# Patient Record
Sex: MALE | Race: WHITE | NOT HISPANIC OR LATINO | URBAN - METROPOLITAN AREA
[De-identification: names, ages, dates, MRNs, and addresses within clinical notes are randomized per-mention and may not be internally consistent; named-entity substitution may affect disease eponyms.]

---

## 2019-03-12 ENCOUNTER — INPATIENT (INPATIENT)
Facility: HOSPITAL | Age: 84
LOS: 5 days | Discharge: NURSING, REHAB OR LONG-TERM CARE | DRG: 194 | End: 2019-03-18
Payer: MEDICARE

## 2019-03-12 VITALS
TEMPERATURE: 99 F | SYSTOLIC BLOOD PRESSURE: 143 MMHG | RESPIRATION RATE: 22 BRPM | WEIGHT: 177.03 LBS | HEIGHT: 68 IN | HEART RATE: 70 BPM | DIASTOLIC BLOOD PRESSURE: 66 MMHG | OXYGEN SATURATION: 91 %

## 2019-03-12 DIAGNOSIS — Z96.653 PRESENCE OF ARTIFICIAL KNEE JOINT, BILATERAL: Chronic | ICD-10-CM

## 2019-03-12 DIAGNOSIS — F41.9 ANXIETY DISORDER, UNSPECIFIED: ICD-10-CM

## 2019-03-12 DIAGNOSIS — R63.8 OTHER SYMPTOMS AND SIGNS CONCERNING FOOD AND FLUID INTAKE: ICD-10-CM

## 2019-03-12 DIAGNOSIS — Z91.89 OTHER SPECIFIED PERSONAL RISK FACTORS, NOT ELSEWHERE CLASSIFIED: ICD-10-CM

## 2019-03-12 DIAGNOSIS — Z86.2 PERSONAL HISTORY OF DISEASES OF THE BLOOD AND BLOOD-FORMING ORGANS AND CERTAIN DISORDERS INVOLVING THE IMMUNE MECHANISM: ICD-10-CM

## 2019-03-12 DIAGNOSIS — E03.9 HYPOTHYROIDISM, UNSPECIFIED: ICD-10-CM

## 2019-03-12 DIAGNOSIS — Z98.89 OTHER SPECIFIED POSTPROCEDURAL STATES: Chronic | ICD-10-CM

## 2019-03-12 DIAGNOSIS — J10.1 INFLUENZA DUE TO OTHER IDENTIFIED INFLUENZA VIRUS WITH OTHER RESPIRATORY MANIFESTATIONS: ICD-10-CM

## 2019-03-12 DIAGNOSIS — N18.9 CHRONIC KIDNEY DISEASE, UNSPECIFIED: ICD-10-CM

## 2019-03-12 LAB
ALBUMIN SERPL ELPH-MCNC: 3.1 G/DL — LOW (ref 3.3–5)
ALP SERPL-CCNC: 60 U/L — SIGNIFICANT CHANGE UP (ref 40–120)
ALT FLD-CCNC: 6 U/L — LOW (ref 10–45)
ANION GAP SERPL CALC-SCNC: 10 MMOL/L — SIGNIFICANT CHANGE UP (ref 5–17)
APPEARANCE UR: CLEAR — SIGNIFICANT CHANGE UP
APTT BLD: 31 SEC — SIGNIFICANT CHANGE UP (ref 27.5–36.3)
AST SERPL-CCNC: 13 U/L — SIGNIFICANT CHANGE UP (ref 10–40)
BASOPHILS # BLD AUTO: 0.05 K/UL — SIGNIFICANT CHANGE UP (ref 0–0.2)
BASOPHILS NFR BLD AUTO: 0.7 % — SIGNIFICANT CHANGE UP (ref 0–2)
BILIRUB SERPL-MCNC: 0.9 MG/DL — SIGNIFICANT CHANGE UP (ref 0.2–1.2)
BILIRUB UR-MCNC: NEGATIVE — SIGNIFICANT CHANGE UP
BUN SERPL-MCNC: 13 MG/DL — SIGNIFICANT CHANGE UP (ref 7–23)
CALCIUM SERPL-MCNC: 8.3 MG/DL — LOW (ref 8.4–10.5)
CHLORIDE SERPL-SCNC: 102 MMOL/L — SIGNIFICANT CHANGE UP (ref 96–108)
CO2 SERPL-SCNC: 25 MMOL/L — SIGNIFICANT CHANGE UP (ref 22–31)
COLOR SPEC: YELLOW — SIGNIFICANT CHANGE UP
CREAT SERPL-MCNC: 1.22 MG/DL — SIGNIFICANT CHANGE UP (ref 0.5–1.3)
DIFF PNL FLD: NEGATIVE — SIGNIFICANT CHANGE UP
EOSINOPHIL # BLD AUTO: 0.35 K/UL — SIGNIFICANT CHANGE UP (ref 0–0.5)
EOSINOPHIL NFR BLD AUTO: 4.9 % — SIGNIFICANT CHANGE UP (ref 0–6)
FLU A RESULT: DETECTED
FLU A RESULT: DETECTED
FLUAV AG NPH QL: DETECTED
FLUBV AG NPH QL: SIGNIFICANT CHANGE UP
GAS PNL BLDV: SIGNIFICANT CHANGE UP
GLUCOSE SERPL-MCNC: 97 MG/DL — SIGNIFICANT CHANGE UP (ref 70–99)
GLUCOSE UR QL: NEGATIVE — SIGNIFICANT CHANGE UP
HCT VFR BLD CALC: 34.4 % — LOW (ref 39–50)
HGB BLD-MCNC: 11.7 G/DL — LOW (ref 13–17)
IMM GRANULOCYTES NFR BLD AUTO: 1.6 % — HIGH (ref 0–1.5)
INR BLD: 1.23 — HIGH (ref 0.88–1.16)
KETONES UR-MCNC: NEGATIVE — SIGNIFICANT CHANGE UP
LACTATE SERPL-SCNC: 0.9 MMOL/L — SIGNIFICANT CHANGE UP (ref 0.5–2)
LEUKOCYTE ESTERASE UR-ACNC: NEGATIVE — SIGNIFICANT CHANGE UP
LYMPHOCYTES # BLD AUTO: 0.69 K/UL — LOW (ref 1–3.3)
LYMPHOCYTES # BLD AUTO: 9.7 % — LOW (ref 13–44)
MCHC RBC-ENTMCNC: 31.1 PG — SIGNIFICANT CHANGE UP (ref 27–34)
MCHC RBC-ENTMCNC: 34 GM/DL — SIGNIFICANT CHANGE UP (ref 32–36)
MCV RBC AUTO: 91.5 FL — SIGNIFICANT CHANGE UP (ref 80–100)
MONOCYTES # BLD AUTO: 0.76 K/UL — SIGNIFICANT CHANGE UP (ref 0–0.9)
MONOCYTES NFR BLD AUTO: 10.7 % — SIGNIFICANT CHANGE UP (ref 2–14)
NEUTROPHILS # BLD AUTO: 5.13 K/UL — SIGNIFICANT CHANGE UP (ref 1.8–7.4)
NEUTROPHILS NFR BLD AUTO: 72.4 % — SIGNIFICANT CHANGE UP (ref 43–77)
NITRITE UR-MCNC: NEGATIVE — SIGNIFICANT CHANGE UP
NRBC # BLD: 0 /100 WBCS — SIGNIFICANT CHANGE UP (ref 0–0)
PH UR: 6 — SIGNIFICANT CHANGE UP (ref 5–8)
PLATELET # BLD AUTO: 152 K/UL — SIGNIFICANT CHANGE UP (ref 150–400)
POTASSIUM SERPL-MCNC: 4.2 MMOL/L — SIGNIFICANT CHANGE UP (ref 3.5–5.3)
POTASSIUM SERPL-SCNC: 4.2 MMOL/L — SIGNIFICANT CHANGE UP (ref 3.5–5.3)
PROT SERPL-MCNC: 5.9 G/DL — LOW (ref 6–8.3)
PROT UR-MCNC: 30 MG/DL
PROTHROM AB SERPL-ACNC: 14 SEC — HIGH (ref 10–12.9)
RBC # BLD: 3.76 M/UL — LOW (ref 4.2–5.8)
RBC # FLD: 13 % — SIGNIFICANT CHANGE UP (ref 10.3–14.5)
RSV RESULT: SIGNIFICANT CHANGE UP
RSV RNA RESP QL NAA+PROBE: SIGNIFICANT CHANGE UP
SODIUM SERPL-SCNC: 137 MMOL/L — SIGNIFICANT CHANGE UP (ref 135–145)
SP GR SPEC: 1.02 — SIGNIFICANT CHANGE UP (ref 1–1.03)
UROBILINOGEN FLD QL: 0.2 E.U./DL — SIGNIFICANT CHANGE UP
WBC # BLD: 7.09 K/UL — SIGNIFICANT CHANGE UP (ref 3.8–10.5)
WBC # FLD AUTO: 7.09 K/UL — SIGNIFICANT CHANGE UP (ref 3.8–10.5)

## 2019-03-12 PROCEDURE — 93010 ELECTROCARDIOGRAM REPORT: CPT

## 2019-03-12 PROCEDURE — 71045 X-RAY EXAM CHEST 1 VIEW: CPT | Mod: 26

## 2019-03-12 PROCEDURE — 99285 EMERGENCY DEPT VISIT HI MDM: CPT | Mod: 25

## 2019-03-12 PROCEDURE — 99223 1ST HOSP IP/OBS HIGH 75: CPT | Mod: GC

## 2019-03-12 RX ORDER — SODIUM CHLORIDE 9 MG/ML
1000 INJECTION INTRAMUSCULAR; INTRAVENOUS; SUBCUTANEOUS ONCE
Qty: 0 | Refills: 0 | Status: COMPLETED | OUTPATIENT
Start: 2019-03-12 | End: 2019-03-12

## 2019-03-12 RX ORDER — HEPARIN SODIUM 5000 [USP'U]/ML
5000 INJECTION INTRAVENOUS; SUBCUTANEOUS EVERY 8 HOURS
Qty: 0 | Refills: 0 | Status: DISCONTINUED | OUTPATIENT
Start: 2019-03-12 | End: 2019-03-18

## 2019-03-12 RX ORDER — CEFTRIAXONE 500 MG/1
2 INJECTION, POWDER, FOR SOLUTION INTRAMUSCULAR; INTRAVENOUS ONCE
Qty: 0 | Refills: 0 | Status: COMPLETED | OUTPATIENT
Start: 2019-03-12 | End: 2019-03-12

## 2019-03-12 RX ORDER — ESCITALOPRAM OXALATE 10 MG/1
10 TABLET, FILM COATED ORAL DAILY
Qty: 0 | Refills: 0 | Status: DISCONTINUED | OUTPATIENT
Start: 2019-03-12 | End: 2019-03-18

## 2019-03-12 RX ORDER — ACETAMINOPHEN 500 MG
1000 TABLET ORAL ONCE
Qty: 0 | Refills: 0 | Status: COMPLETED | OUTPATIENT
Start: 2019-03-12 | End: 2019-03-12

## 2019-03-12 RX ORDER — VANCOMYCIN HCL 1 G
1000 VIAL (EA) INTRAVENOUS ONCE
Qty: 0 | Refills: 0 | Status: COMPLETED | OUTPATIENT
Start: 2019-03-12 | End: 2019-03-12

## 2019-03-12 RX ORDER — ACETAMINOPHEN 500 MG
650 TABLET ORAL EVERY 6 HOURS
Qty: 0 | Refills: 0 | Status: DISCONTINUED | OUTPATIENT
Start: 2019-03-12 | End: 2019-03-18

## 2019-03-12 RX ORDER — LEVOTHYROXINE SODIUM 125 MCG
1 TABLET ORAL
Qty: 0 | Refills: 0 | COMMUNITY

## 2019-03-12 RX ORDER — AZITHROMYCIN 500 MG/1
500 TABLET, FILM COATED ORAL DAILY
Qty: 0 | Refills: 0 | Status: DISCONTINUED | OUTPATIENT
Start: 2019-03-12 | End: 2019-03-13

## 2019-03-12 RX ORDER — LEVOTHYROXINE SODIUM 125 MCG
100 TABLET ORAL DAILY
Qty: 0 | Refills: 0 | Status: DISCONTINUED | OUTPATIENT
Start: 2019-03-12 | End: 2019-03-18

## 2019-03-12 RX ORDER — ESCITALOPRAM OXALATE 10 MG/1
1 TABLET, FILM COATED ORAL
Qty: 0 | Refills: 0 | COMMUNITY

## 2019-03-12 RX ORDER — ACETAMINOPHEN 500 MG
650 TABLET ORAL ONCE
Qty: 0 | Refills: 0 | Status: COMPLETED | OUTPATIENT
Start: 2019-03-12 | End: 2019-03-12

## 2019-03-12 RX ORDER — IPRATROPIUM/ALBUTEROL SULFATE 18-103MCG
3 AEROSOL WITH ADAPTER (GRAM) INHALATION ONCE
Qty: 0 | Refills: 0 | Status: COMPLETED | OUTPATIENT
Start: 2019-03-12 | End: 2019-03-12

## 2019-03-12 RX ORDER — CEFTRIAXONE 500 MG/1
1 INJECTION, POWDER, FOR SOLUTION INTRAMUSCULAR; INTRAVENOUS EVERY 24 HOURS
Qty: 0 | Refills: 0 | Status: DISCONTINUED | OUTPATIENT
Start: 2019-03-13 | End: 2019-03-13

## 2019-03-12 RX ADMIN — Medication 650 MILLIGRAM(S): at 13:36

## 2019-03-12 RX ADMIN — SODIUM CHLORIDE 100 MILLILITER(S): 9 INJECTION INTRAMUSCULAR; INTRAVENOUS; SUBCUTANEOUS at 20:45

## 2019-03-12 RX ADMIN — Medication 650 MILLIGRAM(S): at 21:45

## 2019-03-12 RX ADMIN — HEPARIN SODIUM 5000 UNIT(S): 5000 INJECTION INTRAVENOUS; SUBCUTANEOUS at 21:54

## 2019-03-12 RX ADMIN — Medication 75 MILLIGRAM(S): at 18:35

## 2019-03-12 RX ADMIN — Medication 650 MILLIGRAM(S): at 14:36

## 2019-03-12 RX ADMIN — Medication 3 MILLILITER(S): at 21:54

## 2019-03-12 RX ADMIN — CEFTRIAXONE 2 GRAM(S): 500 INJECTION, POWDER, FOR SOLUTION INTRAMUSCULAR; INTRAVENOUS at 14:13

## 2019-03-12 RX ADMIN — CEFTRIAXONE 100 GRAM(S): 500 INJECTION, POWDER, FOR SOLUTION INTRAMUSCULAR; INTRAVENOUS at 13:43

## 2019-03-12 RX ADMIN — SODIUM CHLORIDE 1000 MILLILITER(S): 9 INJECTION INTRAMUSCULAR; INTRAVENOUS; SUBCUTANEOUS at 13:53

## 2019-03-12 RX ADMIN — Medication 400 MILLIGRAM(S): at 19:54

## 2019-03-12 RX ADMIN — Medication 250 MILLIGRAM(S): at 14:10

## 2019-03-12 RX ADMIN — Medication 650 MILLIGRAM(S): at 20:44

## 2019-03-12 RX ADMIN — SODIUM CHLORIDE 1000 MILLILITER(S): 9 INJECTION INTRAMUSCULAR; INTRAVENOUS; SUBCUTANEOUS at 13:04

## 2019-03-12 NOTE — H&P ADULT - HISTORY OF PRESENT ILLNESS
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 The patient was admitted last Friday to [facility] in [place], where he was treated for a UTI and discharged on Keflex to rehab at the [facility] 1 day prior to admission. Yesterday, when at rehab, the patient began to develop cough productive of yellow sputum, which had small streaks of blood in it this morning. He was also noted to have a fever to 101F, and was therefore sent to the ED for further workup. History obtained from family at bedside, who says that the patient is at his baseline mental status though he does not remember . The patient denies any symptoms including subjective fever, CP, SOB, n/v/d, cough (?), abdominal pain, dysuria. 89 yo M with PMHx stroke (cerebellar per family, no residual deficits), ITP, b/l knee replacements who presents for fever and productive cough x 1 day. At baseline the patient is able to walk with 1-2 person assist and walker, has poor memory though no diagnosis of dementia per ED, and needs help at home with ADLs such as bathing. The patient was admitted last Friday to [facility] in [place], where he was treated for a UTI and discharged on Keflex to rehab at the [facility] in [facility] 1 day prior to admission. Yesterday, when at rehab, the patient began to develop cough productive of yellow sputum, which had small streaks of blood in it this morning. He was also noted to have a fever to 101F, and was therefore sent to the ED for further workup. History obtained from family at bedside, who says that the patient is at his baseline mental status though his memory is poor. The patient denies any symptoms including subjective fever, CP, SOB, n/v/d, cough (though has been as above), abdominal pain, dysuria.     In the ED, /66, HR 70, RR 22, T 99.2, O2 91% on RA. Labs were notable for WBC 7, Hgb 11.7, Plt 152, INR 1.23, Creatinine 1.22, VBG with respiratory alkalosis. CXR showed mild interstitial pulmonary edema and bibasilar atelectasis with no obvious consolidation; RVP was positive for influenza A. He was admitted to medicine for further management of influenza. 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 At baseline the patient is able to walk with 1-2 person assist and walker, has poor memory though no diagnosis of dementia per ED, and needs help at home with ADLs such as bathing. The patient was admitted last Friday to [facility] in [place], where he was treated for a UTI and discharged on Keflex to rehab at the [facility] in [facility] 1 day prior to admission. Yesterday, when at rehab, the patient began to develop cough productive of yellow sputum, which had small streaks of blood in it this morning. He was also noted to have a fever to 101F, and was therefore sent to the ED for further workup. History obtained from family at bedside, who says that the patient is at his baseline mental status though his memory is poor. The patient denies any symptoms including subjective fever, CP, SOB, n/v/d, cough (though has been as above), abdominal pain, dysuria.     In the ED, /66, HR 70, RR 22, T 99.2, O2 91% on RA. Labs were notable for WBC 7, Hgb 11.7, Plt 152, INR 1.23, Creatinine 1.22, VBG with respiratory alkalosis. CXR showed mild interstitial pulmonary edema and bibasilar atelectasis with no obvious consolidation; RVP was positive for influenza A. He was given a 1 L NS bolus, 2g ceftriaxone, 1 g vancomycin, and 75 mg tamiflu in the ED. He was admitted to medicine for further management of influenza.

## 2019-03-12 NOTE — H&P ADULT - NSHPSOCIALHISTORY_GEN_ALL_CORE
Retired [employer].   Never smoker, social alcohol use in the past. No illicit or IV drug use.

## 2019-03-12 NOTE — ED PROVIDER NOTE - CLINICAL SUMMARY MEDICAL DECISION MAKING FREE TEXT BOX
89M with concern for fever, recent hospitalization for UTI now pt with cough, blood in sputum, no massive hemoptysis. Pt with fever despite taking Keflex at sub acute rehab. Plan for admission for IV abx, further w/u by Dr. Dewey. Pt with likely bronchitis, pt denies sob, no tachycardia.

## 2019-03-12 NOTE — ED ADULT TRIAGE NOTE - CHIEF COMPLAINT QUOTE
Pt BIBA from nursing home [facility].  Per daughter who is HCP "He recently completed a course of Abx for a UTI."  Pt upgraded to MD [place], code sepsis called.  Afebrile at this time, report temp of 101 @ NH earlier today.

## 2019-03-12 NOTE — ED CLERICAL - NS ED CLERK NOTE PRE-ARRIVAL INFORMATION; ADDITIONAL PRE-ARRIVAL INFORMATION
89 Y/O M ALIS CADET BEING SENT IN BY DR QUEENIE DEWEY FOR FEVER PLEASE CALL DR DEWEY WHEN PT ARRIVES// r/o urosepsis, admit to Dr. Jameson Dewey

## 2019-03-12 NOTE — H&P ADULT - NSICDXPASTMEDICALHX_GEN_ALL_CORE_FT
PAST MEDICAL HISTORY:  CVA (cerebral infarction) cerebellar stroke per family. Had dysphagia after stroke which since resolved.    Dysphagia due to recent cerebrovascular accident now resolved.    Hypertension

## 2019-03-12 NOTE — H&P ADULT - NSHPPHYSICALEXAM_GEN_ALL_CORE
PHYSICAL EXAM:   VITAL SIGNS:  T(C): 36.4 (03-12-19 @ 17:55), Max: 37.3 (03-12-19 @ 12:46)  HR: 77 (03-12-19 @ 17:55) (69 - 77)  BP: 161/71 (03-12-19 @ 17:55) (112/54 - 161/71)  RR: 18 (03-12-19 @ 17:55) (18 - 22)  SpO2: 95% (03-12-19 @ 17:55) (91% - 95%)  Constitutional: elderly male, resting comfortably in bed; no acute respiratory distress on 2L NC  Head: NC/AT  Eyes: PERRL, EOMI, clear conjunctiva  ENT: no nasal discharge; uvula midline, no oropharyngeal erythema or exudates; MMM  Respiratory: +R sided rhonchi, +bibasilar crackles more prominent on left.   Cardiac: +S1/S2; RRR; no M/R/G  Gastrointestinal: soft, NT/ND; no rebound or guarding  Back: spine midline, no bony tenderness; no CVAT B/L  Extremities: WWP, no clubbing or cyanosis; no peripheral edema  Vascular: 2+ radial, DP/PT pulses B/L  Dermatologic: skin warm, dry and intact; no rashes, wounds, or scars, no petechiae  Neurologic: AAOx3; CNII-XII grossly intact; no focal deficits PHYSICAL EXAM:   VITAL SIGNS:  T(C): 36.4 (03-12-19 @ 17:55), Max: 37.3 (03-12-19 @ 12:46)  HR: 77 (03-12-19 @ 17:55) (69 - 77)  BP: 161/71 (03-12-19 @ 17:55) (112/54 - 161/71)  RR: 18 (03-12-19 @ 17:55) (18 - 22)  SpO2: 95% (03-12-19 @ 17:55) (91% - 95%)  Constitutional: elderly male, resting comfortably in bed; no acute respiratory distress on 2L NC  Head: NC/AT  Eyes: PERRL, EOMI, clear conjunctiva  ENT: no nasal discharge; uvula midline, no oropharyngeal erythema or exudates; MMM  Respiratory: +R sided rhonchi, +bibasilar crackles more prominent on left.   Cardiac: +S1/S2; RRR; no M/R/G  Gastrointestinal: soft, NT/ND; no rebound or guarding  Back: spine midline, no bony tenderness; no CVAT B/L  Extremities: WWP, no clubbing or cyanosis; no peripheral edema  Vascular: 2+ radial, DP/PT pulses B/L  Dermatologic: skin warm, dry and intact; no rashes, wounds, or scars, no petechiae  Neurologic: AAOx1 to place; 4/5 strength in all 4 extremities.

## 2019-03-12 NOTE — H&P ADULT - ASSESSMENT
89 yo M with PMHx stroke (cerebellar per family, no residual deficits), ITP, b/l knee replacements who presents from rehab (after recent hospitalization for UTI) with fever and productive cough x 1 day, found to be influenza A positive. His symptoms are consistent with influenza infection, with possible pneumonia not able to ruled out.

## 2019-03-12 NOTE — ED ADULT NURSE NOTE - CHPI ED NUR HIGHEST TEMPERATURE
[facility]

## 2019-03-12 NOTE — H&P ADULT - NSICDXPROBLEM_GEN_ALL_CORE_FT
PROBLEM DIAGNOSES  Problem: Type A influenza  Assessment and Plan:     Problem: History of ITP  Assessment and Plan:     Problem: Nutrition, metabolism, and development symptoms  Assessment and Plan:     Problem: Transition of care performed with sharing of clinical summary  Assessment and Plan: 1) PCP Contacted On Admission: (Y/N) No    Name, Phone # Dr. B  2) Date of Contact with PCP:   3) PCP Contacted at Discharge: (Y/N)  4) Summary of Handoff Given to PCP:   5) Post Discharge Appointment Date and Location:       R/O PROBLEM DIAGNOSES  Problem: Pneumonia due to infectious organism  Assessment and Plan:     Problem: History of ITP  Assessment and Plan: PROBLEM DIAGNOSES  Problem: Type A influenza  Assessment and Plan: Influenza A + on RVP, likely causing his presenting symptoms of fever and productive cough. Likely caught during prior hospitalization for UTI.   - tamiflu x 5 days (renally dosed)   - supportive treatment, Tylenol PRN for fever/discomfort  - on 2L NC, wean as tolerated    Problem: Chronic kidney disease (CKD)  Assessment and Plan: Creatinine on admission elevated, CKD Stage III vs. JORDANA. Unknown baseline. May be elevated in setting of infection vs. recent antibiotic use vs. baseline CKD.   - continue to monitor  - obtain collateral from PCP    Problem: History of ITP  Assessment and Plan: Has a history of ITP for which he has been on steroid courses for in the past, most recently was on short 1 week steroid taper that ended 3/8. Follows with Dr. Madrid in [place]. Gets Nplate (romiplostim) shots occasionally per family, most recently on Friday. Currently low normal platelet count.   - obtain collateral from Dr. Madrid regarding need for further romiplostim infusions or treatment; likely would be as an outpatient  - no additional treatment    Problem: Hypothyroidism  Assessment and Plan: .   - continue home levothyroxine 100 mcg daily    Problem: Anxiety  Assessment and Plan: History of depression/anxiety, on escitalopram for many years.   - continue home escitalopram 10 mg    Problem: Nutrition, metabolism, and development symptoms  Assessment and Plan: F: no IVF  E: replete K > 4, Mg > 2  N: regular diet   PPX: HSQ  Code: Full code for now, but needs to be clarified with family/HCP given patient does not appear to have capacity. Attempted to call family multiple times 3/12 but unable to reach.     Problem: Transition of care performed with sharing of clinical summary  Assessment and Plan: 1) PCP Contacted On Admission: (Y/N) No    Name, Phone # Dr. Madrid [facility]) [phone]. Attempted to call [facility] rehab 3/12 but was unable to reach staff.   2) Date of Contact with PCP:    3) PCP Contacted at Discharge: (Y/N)  4) Summary of Handoff Given to PCP:   5) Post Discharge Appointment Date and Location:       R/O PROBLEM DIAGNOSES  Problem: Pneumonia due to infectious organism  Assessment and Plan: Also possibly has overlying pneumonia, cannot rule out at this time. Does have risk factors for drug resistant organisms given recent hospitalization and antibiotic treatment for UTI. S/p 1 dose vancomycin in ED (3/12).   - ID Dr. Dewey and pulm Dr. Winkler following, appreciate recs  - continue azithromycin 500 mg x 3 days(3/12 - )  - continue ceftriaxone 1 g IV daily (3/12 - )  - obtain sputum culture  - urine legionella antigen PROBLEM DIAGNOSES  Problem: Type A influenza  Assessment and Plan: Influenza A + on RVP, likely causing his presenting symptoms of fever and productive cough. Likely caught during prior hospitalization for UTI. Did not meet criteria for sepsis with 1/4 SIRS criteria.   - tamiflu x 5 days (renally dosed)   - supportive treatment, Tylenol PRN for fever/discomfort  - on 2L NC, wean as tolerated    Problem: Chronic kidney disease (CKD)  Assessment and Plan: Creatinine on admission elevated, CKD Stage III vs. JORDANA. Unknown baseline. May be elevated in setting of infection vs. recent antibiotic use vs. baseline CKD.   - s/p 1 L NS in ED; careful with additional fluids given some fluid overload on CXR despite no CHF history per daughter  - continue to monitor  - obtain collateral from PCP    Problem: History of ITP  Assessment and Plan: Has a history of ITP for which he has been on steroid courses for in the past, most recently was on short 1 week steroid taper that ended 3/8. Follows with Dr. Madrid in [place]. Gets Nplate (romiplostim) shots occasionally per family, most recently on Friday. Currently low normal platelet count.   - obtain collateral from Dr. Madrid regarding need for further romiplostim infusions or treatment; likely would be as an outpatient  - no additional treatment    Problem: Hypothyroidism  Assessment and Plan: .   - continue home levothyroxine 100 mcg daily    Problem: Anxiety  Assessment and Plan: History of depression/anxiety, on escitalopram for many years.   - continue home escitalopram 10 mg    Problem: Nutrition, metabolism, and development symptoms  Assessment and Plan: F: no IVF  E: replete K > 4, Mg > 2  N: regular diet   PPX: HSQ  Code: DNR/DNI. Confirmed with daughter/HCP (Mary Ann Madsen [phone]) on 3/12.     Problem: Transition of care performed with sharing of clinical summary  Assessment and Plan: 1) PCP Contacted On Admission: (Y/N) No    Name, Phone # Dr. Madrid [facility]) [phone]. Attempted to call [facility] rehab 3/12 but was unable to reach staff.   2) Date of Contact with PCP:    3) PCP Contacted at Discharge: (Y/N)  4) Summary of Handoff Given to PCP:   5) Post Discharge Appointment Date and Location:       R/O PROBLEM DIAGNOSES  Problem: Pneumonia due to infectious organism  Assessment and Plan: Also possibly has overlying pneumonia, cannot rule out at this time. Does have risk factors for drug resistant organisms given recent hospitalization and antibiotic treatment for UTI. S/p 1 dose vancomycin in ED (3/12).   - ID Dr. Dewey and pulm Dr. Winkler following, appreciate recs  - continue azithromycin 500 mg x 3 days(3/12 - )  - continue ceftriaxone 1 g IV daily (3/12 - )  - obtain sputum culture  - urine legionella antigen

## 2019-03-12 NOTE — ED PROVIDER NOTE - PHYSICAL EXAMINATION
gen: sleeping arousable to voice   HEENT: oropharynx clear dry mm  Neck: supple,   CV: rrr   Resp: ctab,  Abd: nontender, no rebound/guarding  Ext: no edema  Neuro: able to answer questions moves all 4 ext to command facial droop at baseline

## 2019-03-12 NOTE — H&P ADULT - NSHPLABSRESULTS_GEN_ALL_CORE
LABS:                        11.7   7.09  )-----------( 152      ( 12 Mar 2019 13:07 )             34.4     0312    137  |  102  |  13  ----------------------------<  97  4.2   |  25  |  1.22    Ca    8.3<L>      12 Mar 2019 13:07    TPro  5.9<L>  /  Alb  3.1<L>  /  TBili  0.9  /  DBili  x   /  AST  13  /  ALT  6<L>  /  AlkPhos  60  03-12    PT/INR - ( 12 Mar 2019 13:07 )   PT: 14.0 sec;   INR: 1.23     PTT - ( 12 Mar 2019 13:07 )  PTT:31.0 sec    Urinalysis Basic - ( 12 Mar 2019 13:34 )    Color: Yellow / Appearance: Clear / S.020 / pH: x  Gluc: x / Ketone: NEGATIVE  / Bili: Negative / Urobili: 0.2 E.U./dL   Blood: x / Protein: 30 mg/dL / Nitrite: NEGATIVE   Leuk Esterase: NEGATIVE / RBC: < 5 /HPF / WBC < 5 /HPF   Sq Epi: x / Non Sq Epi: 0-5 /HPF / Bacteria: Present /HPF    FLU A B RSV Detection by PCR (19 @ 14:09)    Flu A Result: Detected: TYPE:(C=Critical, N=Notification, A=Abnormal) C  TESTS: FLU A B RSV  DATE/TIME CALLED: 19 14:59  CALLED TO: ALEXIS Wellington RN  READ BACK (2 Patient Identifiers)(Y/N): Y  READ BACK VALUES (Y/N): Y  CALLED BY: [facility]  The Flu A B RSV assay is a Real-Time PCR test for the qualitative  detection and differentiation of Influenza A, Influenza B, and  Respiratory Syncytial Virus on nasopharyngeal swabs. The results should  be interpreted in the context of all clinical and laboratory findings.    Flu B Result: NotDetec    RSV Result: NotDete    RADIOLOGY & ADDITIONAL TESTS:   CXR: +bibasilar increased interstitial markings, no obvious consolidation  EKG: NSR @ 72, T wave inversion in aVL, V4-6.

## 2019-03-12 NOTE — ED ADULT NURSE NOTE - OBJECTIVE STATEMENT
BIBA from rehab facility accompanied by daughter reported having hemoptysis and cough that started today. Pt was being treated for UTI last week as per daughter and became more lethargic today.

## 2019-03-12 NOTE — H&P ADULT - ATTENDING COMMENTS
Pt seen and examined at bedside on 3/11/2019 @ 2330    Agree with HPI, ROS as above.     VS, Labs, FH, SH, allergies, medications, imaging reviewed. I personally reviewed the EKG - NSR, non specific T wave flattening in lateral leads. I personally reviewed the patient's charted records - was previously seen at [facility] (prior to initiation of computerized records - but CXR were there to be used for comparison). Agree with physical exam as above    A/P: 89 yo M with PMHx stroke (cerebellar per family, no residual deficits), ITP, b/l knee replacements who presents from rehab (after recent hospitalization for UTI) with fever and productive cough x 1 day, found to be influenza A positive. His symptoms are consistent with influenza infection, with possible pneumonia not able to ruled out.     **Flu  -C/w Tamiflu  -Oxygen with NC as needed  -F/u cultures  -Can hold off abx for now as fever all due to viral etiology    Plan otherwise as outlined above..... Pt seen and examined at bedside on 3/12/2019 @ 2330    Agree with HPI, ROS as above.     VS, Labs, FH, SH, allergies, medications, imaging reviewed. I personally reviewed the EKG - NSR, non specific T wave flattening in lateral leads. I personally reviewed the patient's charted records - was previously seen at [facility] (prior to initiation of computerized records - but CXR were there to be used for comparison). Agree with physical exam as above    A/P: 89 yo M with PMHx stroke (cerebellar per family, no residual deficits), ITP, b/l knee replacements who presents from rehab (after recent hospitalization for UTI) with fever and productive cough x 1 day, found to be influenza A positive. His symptoms are consistent with influenza infection, with possible pneumonia not able to ruled out.     **Flu  -C/w Tamiflu  -Oxygen with NC as needed  -F/u cultures  -Can hold off abx for now as fever all due to viral etiology    Plan otherwise as outlined above.....

## 2019-03-12 NOTE — ED ADULT NURSE NOTE - INTERVENTIONS DEFINITIONS
Call bell, personal items and telephone within reach/[place] to call system/Instruct patient to call for assistance

## 2019-03-12 NOTE — PATIENT PROFILE ADULT - NSPROMEDSADMININFO_GEN_A_NUR
currently lethargic and unable to swallow, when more awake as per daughter, pt can swallow pills whole./no concerns

## 2019-03-13 DIAGNOSIS — R05 COUGH: ICD-10-CM

## 2019-03-13 LAB
-  COAGULASE NEGATIVE STAPHYLOCOCCUS: SIGNIFICANT CHANGE UP
ANION GAP SERPL CALC-SCNC: 11 MMOL/L — SIGNIFICANT CHANGE UP (ref 5–17)
APTT BLD: 34.5 SEC — SIGNIFICANT CHANGE UP (ref 27.5–36.3)
BASOPHILS # BLD AUTO: 0.04 K/UL — SIGNIFICANT CHANGE UP (ref 0–0.2)
BASOPHILS NFR BLD AUTO: 0.7 % — SIGNIFICANT CHANGE UP (ref 0–2)
BUN SERPL-MCNC: 15 MG/DL — SIGNIFICANT CHANGE UP (ref 7–23)
CALCIUM SERPL-MCNC: 7.9 MG/DL — LOW (ref 8.4–10.5)
CHLORIDE SERPL-SCNC: 104 MMOL/L — SIGNIFICANT CHANGE UP (ref 96–108)
CO2 SERPL-SCNC: 23 MMOL/L — SIGNIFICANT CHANGE UP (ref 22–31)
CREAT SERPL-MCNC: 1.21 MG/DL — SIGNIFICANT CHANGE UP (ref 0.5–1.3)
CULTURE RESULTS: NO GROWTH — SIGNIFICANT CHANGE UP
EOSINOPHIL # BLD AUTO: 0.34 K/UL — SIGNIFICANT CHANGE UP (ref 0–0.5)
EOSINOPHIL NFR BLD AUTO: 6 % — SIGNIFICANT CHANGE UP (ref 0–6)
GLUCOSE SERPL-MCNC: 89 MG/DL — SIGNIFICANT CHANGE UP (ref 70–99)
GRAM STN FLD: SIGNIFICANT CHANGE UP
HCT VFR BLD CALC: 34.6 % — LOW (ref 39–50)
HGB BLD-MCNC: 11.3 G/DL — LOW (ref 13–17)
IMM GRANULOCYTES NFR BLD AUTO: 1.6 % — HIGH (ref 0–1.5)
INR BLD: 1.17 — HIGH (ref 0.88–1.16)
LYMPHOCYTES # BLD AUTO: 0.62 K/UL — LOW (ref 1–3.3)
LYMPHOCYTES # BLD AUTO: 10.9 % — LOW (ref 13–44)
MAGNESIUM SERPL-MCNC: 1.8 MG/DL — SIGNIFICANT CHANGE UP (ref 1.6–2.6)
MCHC RBC-ENTMCNC: 30.6 PG — SIGNIFICANT CHANGE UP (ref 27–34)
MCHC RBC-ENTMCNC: 32.7 GM/DL — SIGNIFICANT CHANGE UP (ref 32–36)
MCV RBC AUTO: 93.8 FL — SIGNIFICANT CHANGE UP (ref 80–100)
METHOD TYPE: SIGNIFICANT CHANGE UP
MONOCYTES # BLD AUTO: 0.6 K/UL — SIGNIFICANT CHANGE UP (ref 0–0.9)
MONOCYTES NFR BLD AUTO: 10.6 % — SIGNIFICANT CHANGE UP (ref 2–14)
NEUTROPHILS # BLD AUTO: 3.99 K/UL — SIGNIFICANT CHANGE UP (ref 1.8–7.4)
NEUTROPHILS NFR BLD AUTO: 70.2 % — SIGNIFICANT CHANGE UP (ref 43–77)
NRBC # BLD: 0 /100 WBCS — SIGNIFICANT CHANGE UP (ref 0–0)
PLATELET # BLD AUTO: 157 K/UL — SIGNIFICANT CHANGE UP (ref 150–400)
POTASSIUM SERPL-MCNC: 4.1 MMOL/L — SIGNIFICANT CHANGE UP (ref 3.5–5.3)
POTASSIUM SERPL-SCNC: 4.1 MMOL/L — SIGNIFICANT CHANGE UP (ref 3.5–5.3)
PROTHROM AB SERPL-ACNC: 13.3 SEC — HIGH (ref 10–12.9)
RBC # BLD: 3.69 M/UL — LOW (ref 4.2–5.8)
RBC # FLD: 13 % — SIGNIFICANT CHANGE UP (ref 10.3–14.5)
SODIUM SERPL-SCNC: 138 MMOL/L — SIGNIFICANT CHANGE UP (ref 135–145)
SPECIMEN SOURCE: SIGNIFICANT CHANGE UP
WBC # BLD: 5.68 K/UL — SIGNIFICANT CHANGE UP (ref 3.8–10.5)
WBC # FLD AUTO: 5.68 K/UL — SIGNIFICANT CHANGE UP (ref 3.8–10.5)

## 2019-03-13 PROCEDURE — 99233 SBSQ HOSP IP/OBS HIGH 50: CPT | Mod: GC

## 2019-03-13 RX ORDER — MAGNESIUM SULFATE 500 MG/ML
2 VIAL (ML) INJECTION ONCE
Qty: 0 | Refills: 0 | Status: COMPLETED | OUTPATIENT
Start: 2019-03-13 | End: 2019-03-13

## 2019-03-13 RX ORDER — IPRATROPIUM/ALBUTEROL SULFATE 18-103MCG
3 AEROSOL WITH ADAPTER (GRAM) INHALATION EVERY 6 HOURS
Qty: 0 | Refills: 0 | Status: DISCONTINUED | OUTPATIENT
Start: 2019-03-13 | End: 2019-03-18

## 2019-03-13 RX ADMIN — Medication 30 MILLIGRAM(S): at 05:16

## 2019-03-13 RX ADMIN — HEPARIN SODIUM 5000 UNIT(S): 5000 INJECTION INTRAVENOUS; SUBCUTANEOUS at 23:02

## 2019-03-13 RX ADMIN — Medication 650 MILLIGRAM(S): at 12:23

## 2019-03-13 RX ADMIN — Medication 3 MILLILITER(S): at 23:02

## 2019-03-13 RX ADMIN — ESCITALOPRAM OXALATE 10 MILLIGRAM(S): 10 TABLET, FILM COATED ORAL at 12:11

## 2019-03-13 RX ADMIN — HEPARIN SODIUM 5000 UNIT(S): 5000 INJECTION INTRAVENOUS; SUBCUTANEOUS at 05:16

## 2019-03-13 RX ADMIN — Medication 3 MILLILITER(S): at 18:56

## 2019-03-13 RX ADMIN — Medication 50 GRAM(S): at 09:26

## 2019-03-13 RX ADMIN — Medication 30 MILLIGRAM(S): at 18:56

## 2019-03-13 RX ADMIN — HEPARIN SODIUM 5000 UNIT(S): 5000 INJECTION INTRAVENOUS; SUBCUTANEOUS at 14:05

## 2019-03-13 RX ADMIN — Medication 100 MICROGRAM(S): at 05:16

## 2019-03-13 RX ADMIN — Medication 3 MILLILITER(S): at 12:10

## 2019-03-13 RX ADMIN — Medication 650 MILLIGRAM(S): at 14:06

## 2019-03-13 NOTE — PHYSICAL THERAPY INITIAL EVALUATION ADULT - PERTINENT HX OF CURRENT PROBLEM, REHAB EVAL
89 yo M with PMHx stroke (cerebellar per family, no residual deficits), ITP, b/l knee replacements who presents for fever and productive cough x 1 day. The patient was admitted last Friday to [facility] in [place], where he was treated for a UTI and discharged on Keflex to rehab at the [facility] 1 day prior to admission.

## 2019-03-13 NOTE — PHYSICAL THERAPY INITIAL EVALUATION ADULT - GENERAL OBSERVATIONS, REHAB EVAL
Patient received semi-supine no acute distress +2L O2 NC, wife and daughter present at bedside, cleared for PT by IAM Zuniga, agreeable to PT Evaluation. Left as found +call megan, IAM Zuniga aware. FIM 0

## 2019-03-13 NOTE — PROGRESS NOTE ADULT - NSICDXPROBLEM_GEN_ALL_CORE_FT
PROBLEM DIAGNOSES  Problem: Type A influenza  Assessment and Plan: Influenza A + on RVP, likely causing his presenting symptoms of fever and productive cough. Likely caught during prior hospitalization for UTI. Did not meet criteria for sepsis with 1/4 SIRS criteria.   - tamiflu x 5 days (renally dosed)   - supportive treatment, Tylenol PRN for fever/discomfort  - on 2L NC, wean as tolerated    Problem: Chronic kidney disease (CKD)  Assessment and Plan: Creatinine on admission elevated, CKD Stage III vs. JORDANA. Unknown baseline. May be elevated in setting of infection vs. recent antibiotic use vs. baseline CKD.   - s/p 1 L NS in ED; careful with additional fluids given some fluid overload on CXR despite no CHF history per daughter  - continue to monitor  - obtain collateral from PCP    Problem: History of ITP  Assessment and Plan: Has a history of ITP for which he has been on steroid courses for in the past, most recently was on short 1 week steroid taper that ended 3/8. Follows with Dr. Madrid in [place]. Gets Nplate (romiplostim) shots occasionally per family, most recently on Friday. Currently low normal platelet count.   - obtain collateral from Dr. Madrid regarding need for further romiplostim infusions or treatment; likely would be as an outpatient  - no additional treatment    Problem: Hypothyroidism  Assessment and Plan: .   - continue home levothyroxine 100 mcg daily    Problem: Anxiety  Assessment and Plan: History of depression/anxiety, on escitalopram for many years.   - continue home escitalopram 10 mg    Problem: Nutrition, metabolism, and development symptoms  Assessment and Plan: F: no IVF  E: replete K > 4, Mg > 2  N: regular diet   PPX: HSQ  Code: DNR/DNI. Confirmed with daughter/HCP (Mary Ann Madsen [phone]) on 3/12.     Problem: Transition of care performed with sharing of clinical summary  Assessment and Plan: 1) PCP Contacted On Admission: (Y/N) No    Name, Phone # Dr. Madrid [facility]) [phone]. Attempted to call [facility] rehab 3/12 but was unable to reach staff.   2) Date of Contact with PCP:    3) PCP Contacted at Discharge: (Y/N)  4) Summary of Handoff Given to PCP:   5) Post Discharge Appointment Date and Location:       R/O PROBLEM DIAGNOSES  Problem: Pneumonia due to infectious organism  Assessment and Plan: Also possibly has overlying pneumonia, cannot rule out at this time. Does have risk factors for drug resistant organisms given recent hospitalization and antibiotic treatment for UTI. S/p 1 dose vancomycin in ED (3/12).   - ID Dr. Dewey and pulm Dr. Winkler following, appreciate recs  - continue azithromycin 500 mg x 3 days(3/12 - )  - continue ceftriaxone 1 g IV daily (3/12 - )  - obtain sputum culture  - urine legionella antigen PROBLEM DIAGNOSES  Problem: Type A influenza  Assessment and Plan: Influenza A + on RVP, likely causing his presenting symptoms of fever and productive cough.   - tamiflu x 5 days (renally dosed)   - supportive treatment, Tylenol PRN for fever/discomfort  - on 2L NC, wean as tolerated  -Gram positive in clusters and pairs growing in 1 aerobic bottle from [facility] yesterday, given low suspicion for bacterial pneumonia will hold off on antibiotics at this time. But low threshold to restart if clinically worsens    Problem: Chronic kidney disease (CKD)  Assessment and Plan: Creatinine on admission elevated, CKD Stage III vs. JORDANA. Unknown baseline. May be elevated in setting of infection vs. recent antibiotic use vs. baseline CKD.   - trend BUN/Cr  - continue to monitor    Problem: History of ITP  Assessment and Plan: Has a history of ITP for which he has been on steroid courses for in the past, most recently was on short 1 week steroid taper that ended 3/8. Follows with Dr. Madrid in [place]. Gets Nplate (romiplostim) shots occasionally per family, most recently on Friday. Currently low normal platelet count.   - obtain collateral from Dr. Madrid regarding need for further romiplostim infusions or treatment; likely would be as an outpatient  - no additional treatment    Problem: Anxiety  Assessment and Plan: History of depression/anxiety, on escitalopram for many years.   - continue home escitalopram 10 mg    Problem: Hypothyroidism  Assessment and Plan: - continue home levothyroxine 100 mcg daily    Problem: Nutrition, metabolism, and development symptoms  Assessment and Plan: F: no IVF  E: replete K > 4, Mg > 2  N: regular diet   PPX: HSQ  Code: DNR/DNI. Confirmed with daughter/HCP (Mary Ann Madsen [phone]) on 3/12.     Problem: Transition of care performed with sharing of clinical summary  Assessment and Plan:  1) PCP Contacted On Admission: (Y/N) No    Name, Phone # Dr. Madrid [facility]) [phone]. [facility] but was [facility] to reach staff.   2) Date of Contact with PCP:    3) PCP Contacted at Discharge: (Y/N)  4) Summary of Handoff Given to PCP:   5) Post Discharge Appointment Date and Location:

## 2019-03-13 NOTE — CONSULT NOTE ADULT - ASSESSMENT
possible pneumonia or UTI  ill defined Penicillin allergy    Suggest   blood and urine  cultures  CXR    Vancomycin and Ceftriaxone
89 yo M with PMHx stroke (cerebellar per family, no residual deficits), ITP, b/l knee replacements who presents from rehab (after recent hospitalization for UTI) with fever and productive cough x 1 day, found to be influenza A positive.     Problem Selector:  PROBLEM DIAGNOSES  Problem: Type A influenza  Assessment and Plan: Influenza A + on RVP, likely causing his presenting symptoms of fever and productive cough.   - tamiflu x 5 days (renally dosed)   - supportive treatment, Tylenol PRN for fever/discomfort  - on 2L NC, wean as tolerated  -Gram positive in clusters and pairs growing in 1 aerobic bottle from [facility] yesterday, given low suspicion for bacterial pneumonia will hold off on antibiotics at this time. But low threshold to restart if clinically worsens    Problem: Chronic kidney disease (CKD)  Assessment and Plan: Creatinine on admission elevated, CKD Stage III vs. JORDANA. Unknown baseline. May be elevated in setting of infection vs. recent antibiotic use vs. baseline CKD.   - trend BUN/Cr  - continue to monitor    Problem: History of ITP  Assessment and Plan: Has a history of ITP for which he has been on steroid courses for in the past, most recently was on short 1 week steroid taper that ended 3/8. Follows with Dr. Madrid in [place]. Gets Nplate (romiplostim) shots occasionally per family, most recently on Friday. Currently low normal platelet count.   - obtain collateral from Dr. Madrid regarding need for further romiplostim infusions or treatment; likely would be as an outpatient  - no additional treatment    Problem: Anxiety  Assessment and Plan: History of depression/anxiety, on escitalopram for many years.   - continue home escitalopram 10 mg    Problem: Hypothyroidism  Assessment and Plan: - continue home levothyroxine 100 mcg daily    Problem: Nutrition, metabolism, and development symptoms  Assessment and Plan: F: no IVF  E: replete K > 4, Mg > 2  N: regular diet   PPX: HSQ  Code: DNR/DNI. Confirmed with daughter/HCP (Mary Ann Madsen [phone]) on 3/12.

## 2019-03-13 NOTE — DISCHARGE NOTE PROVIDER - CARE PROVIDER_API CALL
Jameson Dewey)  Infectious Disease; Internal Medicine  [address], [address]  [place] [address]  Phone: [phone]  Fax: [phone]  Follow Up Time: 2 weeks

## 2019-03-13 NOTE — DISCHARGE NOTE PROVIDER - HOSPITAL COURSE
89 yo M with PMHx stroke (cerebellar per family, no residual deficits), ITP, b/l knee replacements who presents from rehab (after recent hospitalization for UTI) with fever and productive cough x 1 day, found to be influenza A positive. He initially received antibiotics for a possible bacterial infection given recent antibiotics and hospitalization but this was discontinued as his symptoms, labwork, and CXR were more indicative of viral infection. On the initial set of blood cultures, one of the aerobic bottles grew gram (+) coag negative cocci which was thought to be a contaminant. He received supportive care with Tylenol and NS IVF. Patient's fever curve trended down and his symptoms have improved. Patient is now medically stable for discharge to [facility]. 89 yo M with PMHx stroke (cerebellar per family, no residual deficits), ITP, b/l knee replacements who presents from rehab (after recent hospitalization for UTI) with fever and productive cough x 1 day, found to be influenza A positive. He initially received antibiotics for a possible bacterial infection given recent antibiotics and hospitalization but this was discontinued as his symptoms, labwork, and CXR were more indicative of viral infection. On the initial set of blood cultures, one of the aerobic bottles grew gram (+) coag negative cocci which was thought to be a contaminant. Repeated surveillance cultures were negative. He received supportive care with Tylenol and NS IVF. Patient's fever curve trended down and his symptoms have improved. However, repeated CXR showed RLL and patient was treated with Levaquin 750mg q48hr (renally dosed) for CAP. Patient is now medically stable for discharge to [facility]. 89 yo M with PMHx stroke (cerebellar per family, no residual deficits), ITP, b/l knee replacements who presents from rehab (after recent hospitalization for UTI) with fever and productive cough x 1 day, found to be influenza A positive. He initially received antibiotics for a possible bacterial infection given recent antibiotics and hospitalization but this was discontinued as his symptoms, labwork, and CXR were more indicative of viral infection. On the initial set of blood cultures, one of the aerobic bottles grew gram (+) coag negative cocci which was thought to be a contaminant. Repeated surveillance cultures were negative. He received supportive care with Tylenol and NS IVF. Patient's fever curve trended down and his symptoms have improved. However, repeated CXR showed RLL and patient was treated with Levaquin 750mg q48hr (renally dosed) for CAP. Patient also received 1 dose of Nplate for which he is due for his ITP. Patient is now medically stable for discharge to [facility].

## 2019-03-13 NOTE — DISCHARGE NOTE PROVIDER - NSDCCPCAREPLAN_GEN_ALL_CORE_FT
PRINCIPAL DISCHARGE DIAGNOSIS  Problem: Type A influenza  Assessment and Plan of Treatment: You were brought to the hospital because of a 1 day history of fever and cough productive of yellow sputum. You were found to be positive for the flu. You were treated with Tylenol and fluids. We watched you to make sure you were doing better and your symptoms did improve. You are now medically stable for discharge back to the rehab facility.      SECONDARY DISCHARGE DIAGNOSES  Problem: Chronic kidney disease (CKD)  Assessment and Plan of Treatment: Your kidney function was found to be abnormal with a creatinine at 1.22 We do not currently have a baseline for you but it did not change with any fluids. Therefore, we believe this to be your baseline. You have stage III chronic kidney disease. Please follow up with your primary care physician.    Problem: Anxiety  Assessment and Plan of Treatment: You have a history of anxiety. Please continue taking your home medications and follow up as needed.    Problem: Hypothyroidism  Assessment and Plan of Treatment: You have a history of low thyroid hormone. Please continue taking your home medications and follow up as needed.    Problem: History of ITP  Assessment and Plan of Treatment: You have a history of ITP and are being followed by Dr. Madrid. Please continue to follow up with Dr. Madrid as regularly scheduled. PRINCIPAL DISCHARGE DIAGNOSIS  Diagnosis: Type A influenza  Assessment and Plan of Treatment: You were brought to the hospital because of a 1 day history of fever and cough productive of yellow sputum. You were found to be positive for the flu. You were treated with Tylenol and fluids. We watched you to make sure you were doing better and your symptoms did improve. You are now medically stable for discharge back to the rehab facility.      SECONDARY DISCHARGE DIAGNOSES  Diagnosis: Chronic kidney disease (CKD)  Assessment and Plan of Treatment: Your kidney function was found to be abnormal with a creatinine at 1.22 We do not currently have a baseline for you but it did not change with any fluids. Therefore, we believe this to be your baseline. You have stage III chronic kidney disease. Please follow up with your primary care physician.    Diagnosis: Hypothyroidism  Assessment and Plan of Treatment: You have a history of low thyroid hormone. Please continue taking your home medications and follow up as needed.    Diagnosis: History of ITP  Assessment and Plan of Treatment: You have a history of ITP and are being followed by Dr. Madrid. You received 1 dose of Nplate while in the hospital.    Diagnosis: Anxiety  Assessment and Plan of Treatment: You have a history of anxiety. Please continue taking your home medications and follow up as needed.

## 2019-03-13 NOTE — PHYSICAL THERAPY INITIAL EVALUATION ADULT - ADDITIONAL COMMENTS
daughter reports patient typically lives at home with wife and son, able to amb 10-15 ft with handheld assist at baseline, has home aide 5-7 days per week for 5 hours, however, was in rehab prior to this admission and would like patient to return to rehab

## 2019-03-13 NOTE — PROGRESS NOTE ADULT - ATTENDING COMMENTS
Patient was seen and examined with the resident team today.  I agree with Dr. 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  BCx x1 +GPC pairs/cluters.  VS - Tax 101.2, -191, HR 78-88, SpO2 93-94% on RA.  Exam - nontoxic appearing, NCAT, MMM, clear OP, CTA B no w/r/r and no increase in WOB, RRR no m/g/r, +BS soft NTND, WWP.  AOx1-2 (family at bedside says pt at baseline).  Labs - WBC 5.68, Hb 11.3, plt 157, Cr 1.21.  Microdata - BCx x1 bottle (3/12) +GPC cultures/pairs (aerobic), +Flu A.      -- agree with admitting team to HOLD abx as suspect fever is 2/2 influenza  -- c/w Tamiflu   -- f/u speciation of bcx (?contaminate)  -- pending above, may need abx and surveillence bcx  -- would hold off on chest CT given SpO2 improved this AM  -- Dispo - pending SpO2 requirement and fever curve, tomorrow v Friday  -- DVT PPx - [facility]    Shivani Gonzales  [phone] Patient was seen and examined with the resident team today.  I agree with Dr. 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  BCx x1 +GPC pairs/cluters.  VS - Tax 101.2, -191, HR 78-88, SpO2 93-94% on RA.  Exam - nontoxic appearing, NCAT, MMM, clear OP, CTA B no w/r/r and no increase in WOB, RRR no m/g/r, +BS soft NTND, WWP.  AOx1-2 (family at bedside says pt at baseline).  Labs - WBC 5.68, Hb 11.3, plt 157, Cr 1.21.  Microdata - BCx x1 bottle (3/12) +GPC cultures/pairs (aerobic), +Flu A.      -- agree with admitting team to HOLD abx as suspect fever is 2/2 influenza --> can addon procalcitonin to help discriminate between bacterial v viral LRTI  -- c/w Tamiflu   -- f/u speciation of bcx (?contaminate)  -- pending above, may need abx and surveillence bcx  -- would hold off on chest CT given SpO2 improved this AM  -- Dispo - pending SpO2 requirement and fever curve, tomorrow v Friday  -- DVT PPx - [facility]    Shivani Gonzales  [phone]

## 2019-03-13 NOTE — PHYSICAL THERAPY INITIAL EVALUATION ADULT - CRITERIA FOR SKILLED THERAPEUTIC INTERVENTIONS
therapy frequency/risk reduction/prevention/anticipated discharge recommendation/rehab potential/functional limitations in following categories/impairments found/predicted duration of therapy intervention/anticipated equipment needs at discharge

## 2019-03-14 DIAGNOSIS — J18.9 PNEUMONIA, UNSPECIFIED ORGANISM: ICD-10-CM

## 2019-03-14 LAB
ANION GAP SERPL CALC-SCNC: 9 MMOL/L — SIGNIFICANT CHANGE UP (ref 5–17)
BUN SERPL-MCNC: 16 MG/DL — SIGNIFICANT CHANGE UP (ref 7–23)
CALCIUM SERPL-MCNC: 7.8 MG/DL — LOW (ref 8.4–10.5)
CHLORIDE SERPL-SCNC: 104 MMOL/L — SIGNIFICANT CHANGE UP (ref 96–108)
CO2 SERPL-SCNC: 25 MMOL/L — SIGNIFICANT CHANGE UP (ref 22–31)
CREAT SERPL-MCNC: 1.08 MG/DL — SIGNIFICANT CHANGE UP (ref 0.5–1.3)
GLUCOSE SERPL-MCNC: 88 MG/DL — SIGNIFICANT CHANGE UP (ref 70–99)
HCT VFR BLD CALC: 32 % — LOW (ref 39–50)
HGB BLD-MCNC: 10.7 G/DL — LOW (ref 13–17)
MAGNESIUM SERPL-MCNC: 2 MG/DL — SIGNIFICANT CHANGE UP (ref 1.6–2.6)
MCHC RBC-ENTMCNC: 30.7 PG — SIGNIFICANT CHANGE UP (ref 27–34)
MCHC RBC-ENTMCNC: 33.4 GM/DL — SIGNIFICANT CHANGE UP (ref 32–36)
MCV RBC AUTO: 92 FL — SIGNIFICANT CHANGE UP (ref 80–100)
NRBC # BLD: 0 /100 WBCS — SIGNIFICANT CHANGE UP (ref 0–0)
PHOSPHATE SERPL-MCNC: 3.2 MG/DL — SIGNIFICANT CHANGE UP (ref 2.5–4.5)
PLATELET # BLD AUTO: 193 K/UL — SIGNIFICANT CHANGE UP (ref 150–400)
POTASSIUM SERPL-MCNC: 4.1 MMOL/L — SIGNIFICANT CHANGE UP (ref 3.5–5.3)
POTASSIUM SERPL-SCNC: 4.1 MMOL/L — SIGNIFICANT CHANGE UP (ref 3.5–5.3)
RBC # BLD: 3.48 M/UL — LOW (ref 4.2–5.8)
RBC # FLD: 12.8 % — SIGNIFICANT CHANGE UP (ref 10.3–14.5)
SODIUM SERPL-SCNC: 138 MMOL/L — SIGNIFICANT CHANGE UP (ref 135–145)
WBC # BLD: 6.14 K/UL — SIGNIFICANT CHANGE UP (ref 3.8–10.5)
WBC # FLD AUTO: 6.14 K/UL — SIGNIFICANT CHANGE UP (ref 3.8–10.5)

## 2019-03-14 PROCEDURE — 71045 X-RAY EXAM CHEST 1 VIEW: CPT | Mod: 26

## 2019-03-14 PROCEDURE — 99233 SBSQ HOSP IP/OBS HIGH 50: CPT | Mod: GC

## 2019-03-14 RX ADMIN — Medication 30 MILLIGRAM(S): at 07:16

## 2019-03-14 RX ADMIN — ESCITALOPRAM OXALATE 10 MILLIGRAM(S): 10 TABLET, FILM COATED ORAL at 11:28

## 2019-03-14 RX ADMIN — HEPARIN SODIUM 5000 UNIT(S): 5000 INJECTION INTRAVENOUS; SUBCUTANEOUS at 07:16

## 2019-03-14 RX ADMIN — HEPARIN SODIUM 5000 UNIT(S): 5000 INJECTION INTRAVENOUS; SUBCUTANEOUS at 22:06

## 2019-03-14 RX ADMIN — Medication 30 MILLIGRAM(S): at 17:48

## 2019-03-14 RX ADMIN — Medication 3 MILLILITER(S): at 11:28

## 2019-03-14 RX ADMIN — HEPARIN SODIUM 5000 UNIT(S): 5000 INJECTION INTRAVENOUS; SUBCUTANEOUS at 14:13

## 2019-03-14 RX ADMIN — Medication 3 MILLILITER(S): at 17:48

## 2019-03-14 RX ADMIN — Medication 3 MILLILITER(S): at 07:16

## 2019-03-14 RX ADMIN — Medication 100 MICROGRAM(S): at 07:17

## 2019-03-14 NOTE — PROGRESS NOTE ADULT - NSICDXPROBLEM_GEN_ALL_CORE_FT
PROBLEM DIAGNOSES  Problem: Pneumonia due to infectious organism  Assessment and Plan:     Problem: Type A influenza  Assessment and Plan:

## 2019-03-14 NOTE — PROGRESS NOTE ADULT - NSICDXPROBLEM_GEN_ALL_CORE_FT
PROBLEM DIAGNOSES  Problem: Type A influenza  Assessment and Plan: Influenza A + on RVP, likely causing his presenting symptoms of fever and productive cough.   - tamiflu x 5 days (renally dosed)   - supportive treatment, Tylenol PRN for fever/discomfort  - O2 sat on RA: 94-95%  -Gram positive in clusters and pairs growing in 1 aerobic bottle from [facility] yesterday, given low suspicion for bacterial pneumonia will hold off on antibiotics at this time. But low threshold to restart if clinically worsens  -Surveillance culture w/ NGTD    Problem: Chronic kidney disease (CKD)  Assessment and Plan: Creatinine on admission elevated, CKD Stage III vs. JORDANA. Unknown baseline. May be elevated in setting of infection vs. recent antibiotic use vs. baseline CKD.   - trend BUN/Cr  - continue to monitor    Problem: History of ITP  Assessment and Plan: Has a history of ITP for which he has been on steroid courses for in the past, most recently was on short 1 week steroid taper that ended 3/8. Follows with Dr. Madrid in [place]. Gets Nplate (romiplostim) shots occasionally per family, most recently on Friday. Currently low normal platelet count.   - obtain collateral from Dr. Madrid regarding need for further romiplostim infusions or treatment; likely would be as an outpatient  - no additional treatment    Problem: Anxiety  Assessment and Plan: History of depression/anxiety, on escitalopram for many years.   - continue home escitalopram 10 mg    Problem: Hypothyroidism  Assessment and Plan: - continue home levothyroxine 100 mcg daily    Problem: Nutrition, metabolism, and development symptoms  Assessment and Plan: F: no IVF  E: replete K > 4, Mg > 2  N: regular diet   PPX: HSQ  Code: DNR/DNI. Confirmed with daughter/HCP (Mary Ann Madsen [phone]) on 3/12.     Problem: Transition of care performed with sharing of clinical summary  Assessment and Plan:  1) PCP Contacted On Admission: (Y/N) No    Name, Phone # Dr. Madrid [facility]) [phone]. [facility] but was unable to reach staff.   2) Date of Contact with PCP:    3) PCP Contacted at Discharge: (Y/N)  4) Summary of Handoff Given to PCP:   5) Post Discharge Appointment Date and Location: PROBLEM DIAGNOSES  Problem: Type A influenza  Assessment and Plan: Influenza A + on RVP, likely causing his presenting symptoms of fever and productive cough.   - tamiflu x 5 days (renally dosed)   - supportive treatment, Tylenol PRN for fever/discomfort  - O2 sat on RA: 94-95%  -Gram positive in clusters and pairs growing in 1 aerobic bottle from [facility] yesterday, given low suspicion for bacterial pneumonia will hold off on antibiotics at this time. But low threshold to restart if clinically worsens  -Surveillance culture w/ NGTD    Problem: Pneumonia due to infectious organism  Assessment and Plan: Procalcitonin 0.27 and RLL infiltrate on CXR likely 2/2 CAP  -Levaquin 750mg q48hr for total of 5 day (3/14-3/19) renally dosed due to CrCl     Problem: Chronic kidney disease (CKD)  Assessment and Plan: Creatinine on admission elevated, CKD Stage III vs. JORDANA. Unknown baseline. May be elevated in setting of infection vs. recent antibiotic use vs. baseline CKD.   - trend BUN/Cr  - continue to monitor    Problem: History of ITP  Assessment and Plan: Has a history of ITP for which he has been on steroid courses for in the past, most recently was on short 1 week steroid taper that ended 3/8. Follows with Dr. Madrid in [place]. Gets Nplate (romiplostim) shots occasionally per family, most recently on Friday. Currently low normal platelet count.   - obtain collateral from Dr. Madrid regarding need for further romiplostim infusions or treatment; likely would be as an outpatient  - no additional treatment    Problem: Anxiety  Assessment and Plan: History of depression/anxiety, on escitalopram for many years.   - continue home escitalopram 10 mg    Problem: Hypothyroidism  Assessment and Plan: - continue home levothyroxine 100 mcg daily    Problem: Nutrition, metabolism, and development symptoms  Assessment and Plan: F: no IVF  E: replete K > 4, Mg > 2  N: regular diet   PPX: HSQ  Code: DNR/DNI. Confirmed with daughter/HCP (Mary Ann Madsen [phone]) on 3/12.     Problem: Transition of care performed with sharing of clinical summary  Assessment and Plan:  1) PCP Contacted On Admission: (Y/N) No    Name, Phone # Dr. Madrid [facility]) [phone]. [facility] but was unable to reach staff.   2) Date of Contact with PCP:    3) PCP Contacted at Discharge: (Y/N)  4) Summary of Handoff Given to PCP:   5) Post Discharge Appointment Date and Location:

## 2019-03-15 LAB
-  CEFAZOLIN: SIGNIFICANT CHANGE UP
-  CLINDAMYCIN: SIGNIFICANT CHANGE UP
-  ERYTHROMYCIN: SIGNIFICANT CHANGE UP
-  LINEZOLID: SIGNIFICANT CHANGE UP
-  OXACILLIN: SIGNIFICANT CHANGE UP
-  PENICILLIN: SIGNIFICANT CHANGE UP
-  RIFAMPIN: SIGNIFICANT CHANGE UP
-  TRIMETHOPRIM/SULFAMETHOXAZOLE: SIGNIFICANT CHANGE UP
-  VANCOMYCIN: SIGNIFICANT CHANGE UP
ANION GAP SERPL CALC-SCNC: 11 MMOL/L — SIGNIFICANT CHANGE UP (ref 5–17)
BUN SERPL-MCNC: 13 MG/DL — SIGNIFICANT CHANGE UP (ref 7–23)
CALCIUM SERPL-MCNC: 7.9 MG/DL — LOW (ref 8.4–10.5)
CHLORIDE SERPL-SCNC: 103 MMOL/L — SIGNIFICANT CHANGE UP (ref 96–108)
CO2 SERPL-SCNC: 25 MMOL/L — SIGNIFICANT CHANGE UP (ref 22–31)
CREAT SERPL-MCNC: 1.18 MG/DL — SIGNIFICANT CHANGE UP (ref 0.5–1.3)
CULTURE RESULTS: SIGNIFICANT CHANGE UP
GLUCOSE SERPL-MCNC: 86 MG/DL — SIGNIFICANT CHANGE UP (ref 70–99)
GRAM STN FLD: SIGNIFICANT CHANGE UP
HCT VFR BLD CALC: 31.2 % — LOW (ref 39–50)
HGB BLD-MCNC: 10.6 G/DL — LOW (ref 13–17)
MAGNESIUM SERPL-MCNC: 2 MG/DL — SIGNIFICANT CHANGE UP (ref 1.6–2.6)
MCHC RBC-ENTMCNC: 31.3 PG — SIGNIFICANT CHANGE UP (ref 27–34)
MCHC RBC-ENTMCNC: 34 GM/DL — SIGNIFICANT CHANGE UP (ref 32–36)
MCV RBC AUTO: 92 FL — SIGNIFICANT CHANGE UP (ref 80–100)
METHOD TYPE: SIGNIFICANT CHANGE UP
NRBC # BLD: 0 /100 WBCS — SIGNIFICANT CHANGE UP (ref 0–0)
ORGANISM # SPEC MICROSCOPIC CNT: SIGNIFICANT CHANGE UP
PHOSPHATE SERPL-MCNC: 2.6 MG/DL — SIGNIFICANT CHANGE UP (ref 2.5–4.5)
PLATELET # BLD AUTO: 228 K/UL — SIGNIFICANT CHANGE UP (ref 150–400)
POTASSIUM SERPL-MCNC: 4.2 MMOL/L — SIGNIFICANT CHANGE UP (ref 3.5–5.3)
POTASSIUM SERPL-SCNC: 4.2 MMOL/L — SIGNIFICANT CHANGE UP (ref 3.5–5.3)
RBC # BLD: 3.39 M/UL — LOW (ref 4.2–5.8)
RBC # FLD: 12.8 % — SIGNIFICANT CHANGE UP (ref 10.3–14.5)
SODIUM SERPL-SCNC: 139 MMOL/L — SIGNIFICANT CHANGE UP (ref 135–145)
SPECIMEN SOURCE: SIGNIFICANT CHANGE UP
WBC # BLD: 5.3 K/UL — SIGNIFICANT CHANGE UP (ref 3.8–10.5)
WBC # FLD AUTO: 5.3 K/UL — SIGNIFICANT CHANGE UP (ref 3.8–10.5)

## 2019-03-15 PROCEDURE — 99233 SBSQ HOSP IP/OBS HIGH 50: CPT | Mod: GC

## 2019-03-15 RX ORDER — ROMIPLOSTIM 250 UG/.5ML
320 INJECTION, POWDER, LYOPHILIZED, FOR SOLUTION SUBCUTANEOUS ONCE
Qty: 0 | Refills: 0 | Status: COMPLETED | OUTPATIENT
Start: 2019-03-15 | End: 2019-03-15

## 2019-03-15 RX ADMIN — ROMIPLOSTIM 320 MICROGRAM(S): 250 INJECTION, POWDER, LYOPHILIZED, FOR SOLUTION SUBCUTANEOUS at 17:39

## 2019-03-15 RX ADMIN — HEPARIN SODIUM 5000 UNIT(S): 5000 INJECTION INTRAVENOUS; SUBCUTANEOUS at 07:20

## 2019-03-15 RX ADMIN — Medication 3 MILLILITER(S): at 11:30

## 2019-03-15 RX ADMIN — Medication 3 MILLILITER(S): at 01:43

## 2019-03-15 RX ADMIN — Medication 30 MILLIGRAM(S): at 17:19

## 2019-03-15 RX ADMIN — Medication 30 MILLIGRAM(S): at 07:20

## 2019-03-15 RX ADMIN — Medication 3 MILLILITER(S): at 17:19

## 2019-03-15 RX ADMIN — HEPARIN SODIUM 5000 UNIT(S): 5000 INJECTION INTRAVENOUS; SUBCUTANEOUS at 13:47

## 2019-03-15 RX ADMIN — Medication 100 MICROGRAM(S): at 07:19

## 2019-03-15 RX ADMIN — Medication 3 MILLILITER(S): at 07:19

## 2019-03-15 RX ADMIN — HEPARIN SODIUM 5000 UNIT(S): 5000 INJECTION INTRAVENOUS; SUBCUTANEOUS at 21:20

## 2019-03-15 RX ADMIN — ESCITALOPRAM OXALATE 10 MILLIGRAM(S): 10 TABLET, FILM COATED ORAL at 11:30

## 2019-03-15 NOTE — CONSULT NOTE ADULT - SUBJECTIVE AND OBJECTIVE BOX
Hematology Oncology Consult Note    Asked by Dr. Huggins (friend of the family) to consult on this 89 year old man with ITP who needs ongoing treatment with N Plate. The chart was reviewed and he was examined.    His past history of ITP dates bask a few years. He has responded to steroids but had a rapid fall in his counts when the steroids were discontinued He had a suboptimal response to ivIG. Most recently, his platelets fell to 3,000 (2018 and again on 19). He was started on N-plate on 17 with an increasing dose according to his platelet count. The dose ws increased to 320 mcg per week on 19. He has tolerated it well.    His admission to [facility] was as follows:  He reported a fever and productive cough x 1 day. At baseline the patient is able to walk with 1-2 person assist and walker, has poor memory though no diagnosis of dementia per ED, and needs help at home with ADLs such as bathing. The patient was admitted last Friday to [facility] in [place], where he was treated for a UTI and discharged on Keflex to rehab at the [facility] 1 day prior to admission. Yesterday, when at rehab, the patient began to develop cough productive of yellow sputum, which had small streaks of blood in it this morning. He was also noted to have a fever to 101F, and was therefore sent to the ED for further workup. History obtained from family at bedside, who says that the patient is at his baseline mental status though his memory is poor. The patient denies any symptoms including subjective fever, CP, SOB, n/v/d, cough (though has been as above), abdominal pain, dysuria.     In the ED, /66, HR 70, RR 22, T 99.2, O2 91% on RA. Labs were notable for WBC 7, Hgb 11.7, Plt 152, INR 1.23, Creatinine 1.22, VBG with respiratory alkalosis. CXR showed mild interstitial pulmonary edema and bibasilar atelectasis with no obvious consolidation; RVP was positive for influenza A. He was given a 1 L NS bolus, 2g ceftriaxone, 1 g vancomycin, and 75 mg tamiflu in the ED. He was admitted to medicine for further management of influenza.     He has done well and he has a planned [facility] for [facility] [place]. He denied epistaxis, gum bleeding, easy bruising, BRBPR or hematuria      His  PMHx is notable for  stroke (cerebellar per family, no residual deficits), ITP, b/l knee replacements     Interval History:    The patient denies chest pain or SOB.    No nausea/vomiting/fevers/chills/night sweats.    No , headaches/dizziness.    Appetite is stable without weight loss.  No abdominal pain/diarrhea/constipation.  No melena or hematochezia.    No dysuria/hematuria.  No history of easy bruising/bleeding.  No gingival bleeding or epistaxis.  No leg pain or leg swelling.    ROS is otherwise negative.          PAST MEDICAL & SURGICAL HISTORY:  Hypertension  Dysphagia due to recent cerebrovascular accident: now resolved.  CVA (cerebral infarction): cerebellar stroke per family. Had dysphagia after stroke which since resolved.  H/O total knee replacement, bilateral  History of cholecystectomy      Allergies:  penicillin (Unknown)      Medications:  heparin  Injectable 5000 Unit(s) SubCutaneous every 8 hours  romiPLOStim Injectable 320 MICROGram(s) SubCutaneous once        Social History:    FAMILY HISTORY:  No pertinent family history in first degree relatives      PHYSICAL EXAM:    T(F): 98.6 (03-15-19 @ 15:46), Max: 99.4 (19 @ 17:26)  HR: 82 (03-15-19 @ 15:46) (72 - 95)  BP: 131/71 (03-15-19 @ 15:46) (129/68 - 137/65)  RR: 18 (03-15-19 @ 15:46) (18 - 20)  SpO2: 94% (03-15-19 @ 15:46) (93% - 95%)  Wt(kg): --    Daily     Daily Weight in k (15 Mar 2019 10:40)    Gen: well developed, well nourished, comfortable sitting in a chair  HEENT: normocephalic/atraumatic, no conjunctival pallor, no scleral icterus, no oral thrush/mucosal bleeding/mucositis  Neck: supple, no masses, no JVD  Back: normal curvature; no spine, rib or CVA tenderness  Cardiovascular: RR, nl S1S2, no murmurs/rubs/gallops  Respiratory: clear to auscultation and percussion  Gastrointestinal: soft, non-tender, normal BS, no splenomegaly  Extremities: no clubbing/cyanosis, no edema, no calf tenderness  Skin: no rash on visible skin; no petechiae no ecchymoses  Psychiatric:  mood stable              Labs:                          10.6   5.30  )-----------( 228      ( 15 Mar 2019 06:56 )             31.2     CBC Full  -  ( 15 Mar 2019 06:56 )  WBC Count : 5.30 K/uL  Hemoglobin : 10.6 g/dL  Hematocrit : 31.2 %  Platelet Count - Automated : 228 K/uL  Mean Cell Volume : 92.0 fl  Mean Cell Hemoglobin : 31.3 pg  Mean Cell Hemoglobin Concentration : 34.0 gm/dL  Auto Neutrophil # : x  Auto Lymphocyte # : x  Auto Monocyte # : x  Auto Eosinophil # : x  Auto Basophil # : x  Auto Neutrophil % : x  Auto Lymphocyte % : x  Auto Monocyte % : x  Auto Eosinophil % : x  Auto Basophil % : x        -15    139  |  103  |  13  ----------------------------<  86  4.2   |  25  |  1.18    Ca    7.9<L>      15 Mar 2019 06:56  Phos  2.6     -15  Mg     2.0     -15
Patient is a 89y old  Male who presents with a chief complaint of fever, chills, cough (13 Mar 2019 12:08)       HPI:  89 yo M with PMHx stroke (cerebellar per family, no residual deficits), ITP, b/l knee replacements who presents for fever and productive cough x 1 day. At baseline the patient is able to walk with 1-2 person assist and walker, has poor memory though no diagnosis of dementia per ED, and needs help at home with ADLs such as bathing. The patient was admitted last Friday to [facility] in [place], where he was treated for a UTI and discharged on Keflex to rehab at the [facility] 1 day prior to admission. Yesterday, when at rehab, the patient began to develop cough productive of yellow sputum, which had small streaks of blood in it this morning. He was also noted to have a fever to 101F, and was therefore sent to the ED for further workup. History obtained from family at bedside, who says that the patient is at his baseline mental status though his memory is poor. The patient denies any symptoms including subjective fever, CP, SOB, n/v/d, cough (though has been as above), abdominal pain, dysuria.     In the ED, /66, HR 70, RR 22, T 99.2, O2 91% on RA. Labs were notable for WBC 7, Hgb 11.7, Plt 152, INR 1.23, Creatinine 1.22, VBG with respiratory alkalosis. CXR showed mild interstitial pulmonary edema and bibasilar atelectasis with no obvious consolidation; RVP was positive for influenza A. He was given a 1 L NS bolus, 2g ceftriaxone, 1 g vancomycin, and 75 mg tamiflu in the ED. He was admitted to medicine for further management of influenza. (12 Mar 2019 16:35)      PAST MEDICAL & SURGICAL HISTORY:  Hypertension  Dysphagia due to recent cerebrovascular accident: now resolved.  CVA (cerebral infarction): cerebellar stroke per family. Had dysphagia after stroke which since resolved.  H/O total knee replacement, bilateral  History of cholecystectomy      MEDICATIONS  (STANDING):  ALBUTerol/ipratropium for Nebulization 3 milliLiter(s) Nebulizer every 6 hours  escitalopram 10 milliGRAM(s) Oral daily  heparin  Injectable 5000 Unit(s) SubCutaneous every 8 hours  levothyroxine 100 MICROGram(s) Oral daily  oseltamivir 30 milliGRAM(s) Oral two times a day    MEDICATIONS  (PRN):  acetaminophen   Tablet .. 650 milliGRAM(s) Oral every 6 hours PRN Temp greater or equal to 38C (100.4F)      Social History:  transferred from [facility]/subacute rehab, lives with his spouse and son in a private house in [place]    Functional Level Prior to Admission: at [facility] , requires assistance with bathing/ toileting, walks with a walker and 1-2 person assistance    FAMILY HISTORY:  No pertinent family history in first degree relatives      CBC Full  -  ( 13 Mar 2019 07:24 )  WBC Count : 5.68 K/uL  Hemoglobin : 11.3 g/dL  Hematocrit : 34.6 %  Platelet Count - Automated : 157 K/uL  Mean Cell Volume : 93.8 fl  Mean Cell Hemoglobin : 30.6 pg  Mean Cell Hemoglobin Concentration : 32.7 gm/dL  Auto Neutrophil # : 3.99 K/uL  Auto Lymphocyte # : 0.62 K/uL  Auto Monocyte # : 0.60 K/uL  Auto Eosinophil # : 0.34 K/uL  Auto Basophil # : 0.04 K/uL  Auto Neutrophil % : 70.2 %  Auto Lymphocyte % : 10.9 %  Auto Monocyte % : 10.6 %  Auto Eosinophil % : 6.0 %  Auto Basophil % : 0.7 %      03-13    138  |  104  |  15  ----------------------------<  89  4.1   |  23  |  1.21    Ca    7.9<L>      13 Mar 2019 07:24  Mg     1.8     -13    TPro  5.9<L>  /  Alb  3.1<L>  /  TBili  0.9  /  DBili  x   /  AST  13  /  ALT  6<L>  /  AlkPhos  60  03-12      Urinalysis Basic - ( 12 Mar 2019 13:34 )    Color: Yellow / Appearance: Clear / S.020 / pH: x  Gluc: x / Ketone: NEGATIVE  / Bili: Negative / Urobili: 0.2 E.U./dL   Blood: x / Protein: 30 mg/dL / Nitrite: NEGATIVE   Leuk Esterase: NEGATIVE / RBC: < 5 /HPF / WBC < 5 /HPF   Sq Epi: x / Non Sq Epi: 0-5 /HPF / Bacteria: Present /HPF          Radiology:    < from: Xray Chest 1 View-PORTABLE IMMEDIATE (19 @ 13:56) >  EXAM:  XR CHEST PORTABLE IMMED 1V                          PROCEDURE DATE:  2019          INTERPRETATION:  CLINICAL INDICATION: 89-year-old with sepsis and fever.      IMPRESSION: Frontal view of the chest is compared to 2015 and   demonstrates no interval change in interstitial pulmonary edema.   Bibasilar atelectasis. Low lung volumes. Normal heart size. No interval   change.                Vital Signs Last 24 Hrs  T(C): 38.4 (13 Mar 2019 12:22), Max: 39.3 (12 Mar 2019 19:37)  T(F): 101.2 (13 Mar 2019 12:22), Max: 102.8 (12 Mar 2019 19:37)  HR: 82 (13 Mar 2019 12:22) (69 - 99)  BP: 105/85 (13 Mar 2019 12:22) (105/85 - 191/80)  BP(mean): --  RR: 20 (13 Mar 2019 12:22) (18 - 25)  SpO2: 94% (13 Mar 2019 12:22) (91% - 98%)    REVIEW OF SYSTEMS:    CONSTITUTIONAL:  fatigue  EYES: No eye pain, visual disturbances, or discharge  ENMT:  No difficulty hearing, tinnitus, vertigo; No sinus or throat pain  NECK: No pain or stiffness  BREASTS: No pain, masses, or nipple discharge  RESPIRATORY: No cough, wheezing, chills or hemoptysis; No shortness of breath  CARDIOVASCULAR: No chest pain, palpitations, dizziness, or leg swelling  GASTROINTESTINAL: No abdominal or epigastric pain. No nausea, vomiting, or hematemesis; No diarrhea or constipation. No melena or hematochezia.  GENITOURINARY: No dysuria, frequency, hematuria, or incontinence  NEUROLOGICAL: No headaches, memory loss, loss of strength, numbness, or tremors  SKIN: No itching, burning, rashes, or lesions   LYMPH NODES: No enlarged glands  ENDOCRINE: No heat or cold intolerance; No hair loss  MUSCULOSKELETAL: No joint pain or swelling; No muscle, back, or extremity pain  PSYCHIATRIC: No depression, anxiety, mood swings, or difficulty sleeping  HEME/LYMPH: No easy bruising, or bleeding gums  ALLERGY AND IMMUNOLOGIC: No hives or eczema  VASCULAR: no swelling, erythema      Physical Exam: on droplet isolation, elderly 89 yo  gentleman lying in semi Marquis's position, c/o feeling tired    Head: normocephalic, atraumatic    Eyes: PERRLA, EOMI, no nystagmus, sclera anicteric    ENT: nasal discharge, uvula midline, no oropharyngeal erythema/exudate    Neck: supple, negative JVD, negative carotid bruits, no thyromegaly    Chest: bibasilar crackles    Cardiovascular: regular rate and rhythm, neg murmurs/rubs/gallops    Abdomen: soft, non distended, non tender, negative rebound/guarding, normal bowel sounds, neg hepatosplenomegaly    Extremities: WWP, neg cyanosis/clubbing/edema, negative calf tenderness to palpation, negative Toribio's sign    :     Neurologic Exam:    Alert and oriented x 2  to person, place,  date/year, speech fluent w/o dysarthria    Cranial Nerves:     II:                       pupils equal, round and reactive to light, visual fields intact   III/ IV/VI:            extraocular movements intact, neg nystagmus, ptosis  V:                       facial sensation intact, V1-3 normal  VII:                     face symmetric, no droop, normal eye closure and smile  VIII:                    hearing intact to finger rub bilaterally  IX/ X:                 soft palate rise symmetrical  XI:                      head turning, shoulder shrug normal  XII:                     tongue midline    Motor Exam:    Upper Extremities:     RIght:  poor effort > 3+/5               negative drift    Left :     poor effort > 3+/5               negative drift    Lower Extremities:                 Right:     poor effort > 3+/5    Left:       poor effort > 3+/5      Sensory:    intact to LT/PP in all UE/LE dermatomes    DTR:            = biceps/     triceps/     brachioradialis                      = patella/   medial hamstring/ankle                      neg clonus                      neg Babinski                      neg Hoffmans    Gait:  not tested        PM&R Impression:    1) deconditioned  2) no focal weakness  3) gait dysfunction  4) influenza A        Recommendations:    1) Physical therapy focusing on therapeutic exercises, bed mobility/transfer out of bed evaluation, progressive ambulation with assistive devices prn.    2) Anticipated Disposition Plan/Recs: return to subacute rehab
HPI:  patient with recent admission to   [facility]   blood culture no growth  urine cultures grew Proteus sensitive to Mamikacin Cefazolin Ceftriaxone Pip-Tazobactam  Possible h/o ITP on intermittent steroids      PAST MEDICAL & SURGICAL HISTORY:  Hypertension  Dysphagia due to recent cerebrovascular accident  CVA (cerebral infarction)  H/O total knee replacement, bilateral  History of cholecystectomy      REVIEW OF SYSTEMS:    fever  Eyes: No eye pain, visual disturbances, or discharge  ENMT:  No difficulty hearing, tinnitus, vertigo; No sinus or throat pain  Neck: No pain or stiffness  Respiratory: No cough, wheezing, chills or hemoptysis  Cardiovascular: No chest pain, palpitations, shortness of breath, dizziness or leg swelling  Gastrointestinal: No abdominal or epigastric pain. No nausea, vomiting or hematemesis; No diarrhea or constipation. No melena or hematochezia.  Genitourinary: No dysuria, frequency, hematuria or incontinence  Neurological: No headaches, memory loss, loss of strength, numbness or tremors  Skin: No itching, burning, rashes or lesions     MEDICATIONS  (STANDING):  cefTRIAXone   IVPB 2 Gram(s) IV Intermittent Once  vancomycin  IVPB 1000 milliGRAM(s) IV Intermittent once    MEDICATIONS  (PRN):      cefTRIAXone   IVPB 2 Gram(s) IV Intermittent Once  vancomycin  IVPB 1000 milliGRAM(s) IV Intermittent once      Allergies    penicillin (Unknown)    Intolerances        SOCIAL HISTORY:    FAMILY HISTORY:      Vital Signs Last 24 Hrs  T(C): 37.3 (12 Mar 2019 12:46), Max: 37.3 (12 Mar 2019 12:46)  T(F): 99.2 (12 Mar 2019 12:46), Max: 99.2 (12 Mar 2019 12:46)  HR: 70 (12 Mar 2019 12:46) (70 - 70)  BP: 143/66 (12 Mar 2019 12:46) (143/66 - 143/66)  BP(mean): --  RR: 22 (12 Mar 2019 12:46) (22 - 22)  SpO2: 91% (12 Mar 2019 12:46) (91% - 91%)    PHYSICAL EXAM:    General:  in no acute distress  Eyes: PERRL, EOM intact; conjunctiva and sclera clear  Head: Normocephalic; atraumatic  ENMT: No nasal discharge; airway clear  Neck: Supple; non tender; no masses  Respiratory: slight ronchi  Cardiovascular: Regular rate and rhythm. S1 and S2 Normal; No murmurs, gallops or rubs  Gastrointestinal: Soft non-tender non-distended; Normal bowel sounds; No hepatosplenomegaly  Genitourinary: No costovertebral angle tenderness  Extremities: Normal range of motion, No clubbing, cyanosis or edema  Vascular: Peripheral pulses palpable 2+ bilaterally  Neurological: Alert and oriented x3, slightly lethargic  Skin: Warm and dry. No acute rash  LABS:                        11.7   7.09  )-----------( 152      ( 12 Mar 2019 13:07 )             34.4     03-12    137  |  102  |  13  ----------------------------<  97  4.2   |  25  |  1.22    Ca    8.3<L>      12 Mar 2019 13:07    TPro  5.9<L>  /  Alb  3.1<L>  /  TBili  0.9  /  DBili  x   /  AST  13  /  ALT  6<L>  /  AlkPhos  60  03-12    PT/INR - ( 12 Mar 2019 13:07 )   PT: 14.0 sec;   INR: 1.23          PTT - ( 12 Mar 2019 13:07 )  PTT:31.0 sec      RADIOLOGY & ADDITIONAL STUDIES:

## 2019-03-15 NOTE — CONSULT NOTE ADULT - ATTENDING COMMENTS
This is a 89 year old man with chronic ITP on Nplate admitted with flu and pneumonia. He will need ongoing N plate  to maintain his counts. His lasat Nplate was one week ago, so I ordered 320 mcg today. He should be scheduled for a STAT CBC and an N-plate injection next Friday (same dose). If his platelets are above 200,000 again, then he should go to every two week injections of the same dose.

## 2019-03-15 NOTE — CONSULT NOTE ADULT - NSICDXPROBLEM_GEN_ALL_CORE_FT
PROBLEM DIAGNOSES  Problem: Pneumonia due to infectious organism  Recommendation:     Problem: Chronic kidney disease (CKD)  Recommendation:     Problem: History of ITP  Recommendation:     Problem: Type A influenza  Recommendation:

## 2019-03-15 NOTE — DIETITIAN INITIAL EVALUATION ADULT. - ENERGY NEEDS
ABW used for calculations as pt between % of IBW.   ABW 80.1kg, IBW 70kg, 114% IBW, ht 68", BMI 26.9   Nutrient needs based on [facility] standards of care for repletion in older adults 2/2 flu, fluid per team

## 2019-03-15 NOTE — DIETITIAN INITIAL EVALUATION ADULT. - OTHER INFO
89M with PMHx stroke (cerebellar per family, no residual deficits), ITP, b/l knee replacements who presents from rehab (after recent hospitalization for UTI) with fever and productive cough x 1 day, found to be influenza A positive + possible PNA. Pt must finish course of tamiflu (on day 2/5) prior to returning to NH. No noted n/v/d/c, chewing/ swallowing issues or pain impacting intake, skin with rash. NKFA or changes in wt PTA. Tolerating regular diet at this time, observed 50-75% breakfast tray consumed, encouraged continuation of intake. Will follow per protocol.

## 2019-03-15 NOTE — DIETITIAN INITIAL EVALUATION ADULT. - NS AS NUTRI DX NUTRIENT
NEUROSENSORY - ADULT     Achieves stable or improved neurological status Adequate for Discharge     Achieves maximal functionality and self care Adequate for Discharge Increased nutrient needs (specify)/kcal

## 2019-03-15 NOTE — PROGRESS NOTE ADULT - NSICDXPROBLEM_GEN_ALL_CORE_FT
PROBLEM DIAGNOSES  Problem: Type A influenza  Assessment and Plan: Influenza A + on RVP, likely causing his presenting symptoms of fever and productive cough.   - tamiflu x 5 days (renally dosed) -(3/13-3/17)  - supportive treatment, Tylenol PRN for fever/discomfort  - O2 sat on RA: 94-95%  -Gram positive in clusters and pairs growing in 1 aerobic bottle from [facility] yesterday, given low suspicion for bacterial pneumonia will hold off on antibiotics at this time. But low threshold to restart if clinically worsens  -Surveillance culture w/ NGTD    Problem: Pneumonia due to infectious organism  Assessment and Plan: Procalcitonin 0.27 and RLL infiltrate on CXR likely 2/2 CAP  -Levaquin 750mg q48hr for total of 5 day (3/14-3/19) renally dosed due to CrCl     Problem: Chronic kidney disease (CKD)  Assessment and Plan: Creatinine on admission elevated, CKD Stage III vs. JORDANA. Unknown baseline. May be elevated in setting of infection vs. recent antibiotic use vs. baseline CKD.   - trend BUN/Cr  - continue to monitor    Problem: History of ITP  Assessment and Plan: Has a history of ITP for which he has been on steroid courses for in the past, most recently was on short 1 week steroid taper that ended 3/8. Follows with Dr. Madrid in [place]. Gets Nplate (romiplostim) shots occasionally per family, most recently on Friday. Currently low normal platelet count.   - obtain collateral from Dr. Madrid regarding need for further romiplostim infusions or treatment; likely would be as an outpatient  - no additional treatment    Problem: Anxiety  Assessment and Plan: History of depression/anxiety, on escitalopram for many years.   - continue home escitalopram 10 mg    Problem: Hypothyroidism  Assessment and Plan: - continue home levothyroxine 100 mcg daily    Problem: Nutrition, metabolism, and development symptoms  Assessment and Plan: F: no IVF  E: replete K > 4, Mg > 2  N: regular diet   PPX: HSQ  Code: DNR/DNI. Confirmed with daughter/HCP (Mary Ann Madsen [phone]) on 3/12.     Problem: Transition of care performed with sharing of clinical summary  Assessment and Plan:  1) PCP Contacted On Admission: (Y/N) No    Name, Phone # Dr. Madrid ([facility]) [phone]. [facility] but was [facility] to reach staff. Spoke w/ Dr. Deewy (ID) who sees patient as outpatient  2) Date of Contact with PCP:    3) PCP Contacted at Discharge: (Y/N)  4) Summary of Handoff Given to PCP:   5) Post Discharge Appointment Date and Location: PROBLEM DIAGNOSES  Problem: Type A influenza  Assessment and Plan: Influenza A + on RVP, likely causing his presenting symptoms of fever and productive cough.   - tamiflu x 5 days (renally dosed) -(3/13-3/17)  - supportive treatment, Tylenol PRN for fever/discomfort  - O2 sat on RA: 94-95%  -Gram positive in clusters and pairs growing in 1 aerobic bottle from [facility] yesterday, given low suspicion for bacterial pneumonia will hold off on antibiotics at this time. But low threshold to restart if clinically worsens  -Surveillance culture w/ NGTD    Problem: Pneumonia due to infectious organism  Assessment and Plan: Procalcitonin 0.27 and RLL infiltrate on CXR likely 2/2 CAP  -Levaquin 750mg q48hr for total of 5 day (3/14-3/19) renally dosed due to CrCl     Problem: Chronic kidney disease (CKD)  Assessment and Plan: Creatinine on admission elevated, CKD Stage III vs. JORDANA. Unknown baseline. May be elevated in setting of infection vs. recent antibiotic use vs. baseline CKD.   - trend BUN/Cr  - continue to monitor    Problem: History of ITP  Assessment and Plan: Has a history of ITP for which he has been on steroid courses for in the past, most recently was on short 1 week steroid taper that ended 3/8. Follows with Dr. Madrid in [place]. Gets Nplate (romiplostim) shots occasionally per family, most recently on Friday. Currently low normal platelet count.   - obtain collateral from Dr. Madrid regarding need for further romiplostim infusions or treatment; likely would be as an outpatient  - no additional treatment    Problem: Anxiety  Assessment and Plan: History of depression/anxiety, on escitalopram for many years.   - continue home escitalopram 10 mg    Problem: Hypothyroidism  Assessment and Plan: - continue home levothyroxine 100 mcg daily    Problem: Nutrition, metabolism, and development symptoms  Assessment and Plan: F: no IVF  E: replete K > 4, Mg > 2  N: regular diet   PPX: HSQ  Code: DNR/DNI. Confirmed with daughter/HCP (Mary Ann Madsen [phone]) on 3/12.     Problem: Transition of care performed with sharing of clinical summary  Assessment and Plan:  1) PCP Contacted On Admission: (Y/N) No    Name, Phone # Dr. Madrid ([facility]) [phone]. Attempted to call [facility] rehab 3/12 but was unable to reach staff. Spoke w/ Dr. Dewey (ID) who sees patient as outpatient  2) Date of Contact with PCP:   3/15/18  3) PCP Contacted at Discharge: (Y/N)   4) Summary of Handoff Given to PCP:   5) Post Discharge Appointment Date and Location: Pending [facility] d/c

## 2019-03-15 NOTE — PROGRESS NOTE ADULT - NSICDXPROBLEM_GEN_ALL_CORE_FT
PROBLEM DIAGNOSES  Problem: Pneumonia due to infectious organism  Assessment and Plan: Continue Levofloxocin    Problem: Type A influenza  Assessment and Plan:

## 2019-03-16 LAB
ANION GAP SERPL CALC-SCNC: 9 MMOL/L — SIGNIFICANT CHANGE UP (ref 5–17)
BASOPHILS # BLD AUTO: 0.03 K/UL — SIGNIFICANT CHANGE UP (ref 0–0.2)
BASOPHILS NFR BLD AUTO: 0.6 % — SIGNIFICANT CHANGE UP (ref 0–2)
BUN SERPL-MCNC: 11 MG/DL — SIGNIFICANT CHANGE UP (ref 7–23)
CALCIUM SERPL-MCNC: 8.1 MG/DL — LOW (ref 8.4–10.5)
CHLORIDE SERPL-SCNC: 103 MMOL/L — SIGNIFICANT CHANGE UP (ref 96–108)
CO2 SERPL-SCNC: 26 MMOL/L — SIGNIFICANT CHANGE UP (ref 22–31)
CREAT SERPL-MCNC: 1.17 MG/DL — SIGNIFICANT CHANGE UP (ref 0.5–1.3)
EOSINOPHIL # BLD AUTO: 0.35 K/UL — SIGNIFICANT CHANGE UP (ref 0–0.5)
EOSINOPHIL NFR BLD AUTO: 6.6 % — HIGH (ref 0–6)
GLUCOSE SERPL-MCNC: 87 MG/DL — SIGNIFICANT CHANGE UP (ref 70–99)
HCT VFR BLD CALC: 34.5 % — LOW (ref 39–50)
HGB BLD-MCNC: 11.3 G/DL — LOW (ref 13–17)
IMM GRANULOCYTES NFR BLD AUTO: 0.9 % — SIGNIFICANT CHANGE UP (ref 0–1.5)
LYMPHOCYTES # BLD AUTO: 1.84 K/UL — SIGNIFICANT CHANGE UP (ref 1–3.3)
LYMPHOCYTES # BLD AUTO: 34.5 % — SIGNIFICANT CHANGE UP (ref 13–44)
MAGNESIUM SERPL-MCNC: 1.9 MG/DL — SIGNIFICANT CHANGE UP (ref 1.6–2.6)
MCHC RBC-ENTMCNC: 30.2 PG — SIGNIFICANT CHANGE UP (ref 27–34)
MCHC RBC-ENTMCNC: 32.8 GM/DL — SIGNIFICANT CHANGE UP (ref 32–36)
MCV RBC AUTO: 92.2 FL — SIGNIFICANT CHANGE UP (ref 80–100)
MONOCYTES # BLD AUTO: 0.44 K/UL — SIGNIFICANT CHANGE UP (ref 0–0.9)
MONOCYTES NFR BLD AUTO: 8.3 % — SIGNIFICANT CHANGE UP (ref 2–14)
NEUTROPHILS # BLD AUTO: 2.62 K/UL — SIGNIFICANT CHANGE UP (ref 1.8–7.4)
NEUTROPHILS NFR BLD AUTO: 49.1 % — SIGNIFICANT CHANGE UP (ref 43–77)
NRBC # BLD: 0 /100 WBCS — SIGNIFICANT CHANGE UP (ref 0–0)
PHOSPHATE SERPL-MCNC: 3.1 MG/DL — SIGNIFICANT CHANGE UP (ref 2.5–4.5)
PLATELET # BLD AUTO: 349 K/UL — SIGNIFICANT CHANGE UP (ref 150–400)
POTASSIUM SERPL-MCNC: 4.8 MMOL/L — SIGNIFICANT CHANGE UP (ref 3.5–5.3)
POTASSIUM SERPL-SCNC: 4.8 MMOL/L — SIGNIFICANT CHANGE UP (ref 3.5–5.3)
RBC # BLD: 3.74 M/UL — LOW (ref 4.2–5.8)
RBC # FLD: 12.9 % — SIGNIFICANT CHANGE UP (ref 10.3–14.5)
SODIUM SERPL-SCNC: 138 MMOL/L — SIGNIFICANT CHANGE UP (ref 135–145)
WBC # BLD: 5.33 K/UL — SIGNIFICANT CHANGE UP (ref 3.8–10.5)
WBC # FLD AUTO: 5.33 K/UL — SIGNIFICANT CHANGE UP (ref 3.8–10.5)

## 2019-03-16 PROCEDURE — 71045 X-RAY EXAM CHEST 1 VIEW: CPT | Mod: 26

## 2019-03-16 PROCEDURE — 99233 SBSQ HOSP IP/OBS HIGH 50: CPT | Mod: GC

## 2019-03-16 RX ADMIN — Medication 3 MILLILITER(S): at 17:36

## 2019-03-16 RX ADMIN — Medication 3 MILLILITER(S): at 06:49

## 2019-03-16 RX ADMIN — HEPARIN SODIUM 5000 UNIT(S): 5000 INJECTION INTRAVENOUS; SUBCUTANEOUS at 15:54

## 2019-03-16 RX ADMIN — Medication 30 MILLIGRAM(S): at 06:48

## 2019-03-16 RX ADMIN — HEPARIN SODIUM 5000 UNIT(S): 5000 INJECTION INTRAVENOUS; SUBCUTANEOUS at 06:48

## 2019-03-16 RX ADMIN — HEPARIN SODIUM 5000 UNIT(S): 5000 INJECTION INTRAVENOUS; SUBCUTANEOUS at 22:18

## 2019-03-16 RX ADMIN — ESCITALOPRAM OXALATE 10 MILLIGRAM(S): 10 TABLET, FILM COATED ORAL at 12:04

## 2019-03-16 RX ADMIN — Medication 3 MILLILITER(S): at 12:04

## 2019-03-16 RX ADMIN — Medication 3 MILLILITER(S): at 01:12

## 2019-03-16 RX ADMIN — Medication 30 MILLIGRAM(S): at 17:37

## 2019-03-16 RX ADMIN — Medication 100 MICROGRAM(S): at 06:48

## 2019-03-16 NOTE — PROGRESS NOTE ADULT - NSICDXPROBLEM_GEN_ALL_CORE_FT
PROBLEM DIAGNOSES  Problem: Type A influenza  Assessment and Plan: Influenza A + on RVP, likely causing his presenting symptoms of fever and productive cough.   - tamiflu x 5 days (renally dosed) -(3/13-3/17)  - supportive treatment, Tylenol PRN for fever/discomfort  - O2 sat on RA: 94-95%  -Gram positive in clusters and pairs growing in 1 aerobic bottle from [facility] yesterday, given low suspicion for bacterial pneumonia will hold off on antibiotics at this time. But low threshold to restart if clinically worsens  -Surveillance culture w/ NGTD    Problem: Pneumonia due to infectious organism  Assessment and Plan: Procalcitonin 0.27 and RLL infiltrate on CXR likely 2/2 CAP  -Levaquin 750mg q48hr for total of 5 day (3/14-3/19) renally dosed due to CrCl     Problem: Chronic kidney disease (CKD)  Assessment and Plan: Creatinine on admission elevated, CKD Stage III vs. JORDANA. Unknown baseline. May be elevated in setting of infection vs. recent antibiotic use vs. baseline CKD.   - trend BUN/Cr  - continue to monitor    Problem: History of ITP  Assessment and Plan: Has a history of ITP for which he has been on steroid courses for in the past, most recently was on short 1 week steroid taper that ended 3/8. Follows with Dr. Madrid in [place]. Gets Nplate (romiplostim) shots occasionally per family, most recently on Friday. Currently low normal platelet count.   - Received Nplate yesterday  -vianney onc recs appreciated    Problem: Anxiety  Assessment and Plan: History of depression/anxiety, on escitalopram for many years.   - continue home escitalopram 10 mg    Problem: Hypothyroidism  Assessment and Plan: - continue home levothyroxine 100 mcg daily    Problem: Nutrition, metabolism, and development symptoms  Assessment and Plan: F: no IVF  E: replete K > 4, Mg > 2  N: regular diet   PPX: HSQ  Code: DNR/DNI. Confirmed with daughter/HCP (Mary Ann Madsen [phone]) on 3/12.     Problem: Transition of care performed with sharing of clinical summary  Assessment and Plan:  1) PCP Contacted On Admission: (Y/N) No    Name, Phone # Dr. Madrid ([facility]) [phone]. [facility] Spoke w/ Dr. Dewey (ID) who sees patient as outpatient  2) Date of Contact with PCP:   3/15/18  3) PCP Contacted at Discharge: (Y/N)   4) Summary of Handoff Given to PCP:   5) Post Discharge Appointment Date and Location: Pending [facility] d/c PROBLEM DIAGNOSES  Problem: Type A influenza  Assessment and Plan: Influenza A + on RVP, likely causing his presenting symptoms of fever and productive cough.   - tamiflu x 5 days (renally dosed) -(3/13-3/17)  - supportive treatment, Tylenol PRN for fever/discomfort  - O2 sat on RA: 94-95%  -Surveillance culture w/ NGTD  -1 set of blood cultures growing staph epidermidis,  likely contaminant as patient clinically improving and surveillance cultures and other set of blood cultures growing nothing    Problem: Pneumonia due to infectious organism  Assessment and Plan: Procalcitonin 0.27 and RLL infiltrate on CXR likely 2/2 CAP  -Levaquin 750mg q48hr for total of 5 day (3/14-3/19) renally dosed due to CrCl     Problem: Chronic kidney disease (CKD)  Assessment and Plan: Creatinine on admission elevated, CKD Stage III vs. JORDANA. Unknown baseline. May be elevated in setting of infection vs. recent antibiotic use vs. baseline CKD.   - trend BUN/Cr  - continue to monitor    Problem: History of ITP  Assessment and Plan: Has a history of ITP for which he has been on steroid courses for in the past, most recently was on short 1 week steroid taper that ended 3/8. Follows with Dr. Madrid in [place]. Gets Nplate (romiplostim) shots occasionally per family, most recently on Friday. Currently low normal platelet count.   - Received Nplate yesterday  -vianney onc recs appreciated    Problem: Anxiety  Assessment and Plan: History of depression/anxiety, on escitalopram for many years.   - continue home escitalopram 10 mg    Problem: Hypothyroidism  Assessment and Plan: - continue home levothyroxine 100 mcg daily    Problem: Nutrition, metabolism, and development symptoms  Assessment and Plan: F: no IVF  E: replete K > 4, Mg > 2  N: regular diet   PPX: HSQ  Code: DNR/DNI. Confirmed with daughter/HCP (Mary Ann Madsen [phone]) on 3/12.     Problem: Transition of care performed with sharing of clinical summary  Assessment and Plan:  1) PCP Contacted On Admission: (Y/N) No    Name, Phone # Dr. Madrid ([facility]) [phone]. [facility] Spoke w/ Dr. Dewey (ID) who sees patient as outpatient  2) Date of Contact with PCP:   3/15/18  3) PCP Contacted at Discharge: (Y/N)   4) Summary of Handoff Given to PCP:   5) Post Discharge Appointment Date and Location: Pending [facility] d/c

## 2019-03-17 LAB
ANION GAP SERPL CALC-SCNC: 10 MMOL/L — SIGNIFICANT CHANGE UP (ref 5–17)
BUN SERPL-MCNC: 12 MG/DL — SIGNIFICANT CHANGE UP (ref 7–23)
CALCIUM SERPL-MCNC: 8.2 MG/DL — LOW (ref 8.4–10.5)
CHLORIDE SERPL-SCNC: 103 MMOL/L — SIGNIFICANT CHANGE UP (ref 96–108)
CO2 SERPL-SCNC: 27 MMOL/L — SIGNIFICANT CHANGE UP (ref 22–31)
CREAT SERPL-MCNC: 1.26 MG/DL — SIGNIFICANT CHANGE UP (ref 0.5–1.3)
CULTURE RESULTS: SIGNIFICANT CHANGE UP
GLUCOSE SERPL-MCNC: 89 MG/DL — SIGNIFICANT CHANGE UP (ref 70–99)
HCT VFR BLD CALC: 33.6 % — LOW (ref 39–50)
HGB BLD-MCNC: 11 G/DL — LOW (ref 13–17)
MAGNESIUM SERPL-MCNC: 2 MG/DL — SIGNIFICANT CHANGE UP (ref 1.6–2.6)
MCHC RBC-ENTMCNC: 30.5 PG — SIGNIFICANT CHANGE UP (ref 27–34)
MCHC RBC-ENTMCNC: 32.7 GM/DL — SIGNIFICANT CHANGE UP (ref 32–36)
MCV RBC AUTO: 93.1 FL — SIGNIFICANT CHANGE UP (ref 80–100)
NRBC # BLD: 0 /100 WBCS — SIGNIFICANT CHANGE UP (ref 0–0)
PHOSPHATE SERPL-MCNC: 3.9 MG/DL — SIGNIFICANT CHANGE UP (ref 2.5–4.5)
PLATELET # BLD AUTO: 409 K/UL — HIGH (ref 150–400)
POTASSIUM SERPL-MCNC: 4.6 MMOL/L — SIGNIFICANT CHANGE UP (ref 3.5–5.3)
POTASSIUM SERPL-SCNC: 4.6 MMOL/L — SIGNIFICANT CHANGE UP (ref 3.5–5.3)
RBC # BLD: 3.61 M/UL — LOW (ref 4.2–5.8)
RBC # FLD: 12.9 % — SIGNIFICANT CHANGE UP (ref 10.3–14.5)
SODIUM SERPL-SCNC: 140 MMOL/L — SIGNIFICANT CHANGE UP (ref 135–145)
SPECIMEN SOURCE: SIGNIFICANT CHANGE UP
WBC # BLD: 5.09 K/UL — SIGNIFICANT CHANGE UP (ref 3.8–10.5)
WBC # FLD AUTO: 5.09 K/UL — SIGNIFICANT CHANGE UP (ref 3.8–10.5)

## 2019-03-17 PROCEDURE — 99233 SBSQ HOSP IP/OBS HIGH 50: CPT

## 2019-03-17 PROCEDURE — 71045 X-RAY EXAM CHEST 1 VIEW: CPT | Mod: 26

## 2019-03-17 RX ADMIN — Medication 3 MILLILITER(S): at 18:25

## 2019-03-17 RX ADMIN — Medication 3 MILLILITER(S): at 11:31

## 2019-03-17 RX ADMIN — Medication 3 MILLILITER(S): at 06:41

## 2019-03-17 RX ADMIN — HEPARIN SODIUM 5000 UNIT(S): 5000 INJECTION INTRAVENOUS; SUBCUTANEOUS at 13:50

## 2019-03-17 RX ADMIN — Medication 30 MILLIGRAM(S): at 06:41

## 2019-03-17 RX ADMIN — HEPARIN SODIUM 5000 UNIT(S): 5000 INJECTION INTRAVENOUS; SUBCUTANEOUS at 22:12

## 2019-03-17 RX ADMIN — HEPARIN SODIUM 5000 UNIT(S): 5000 INJECTION INTRAVENOUS; SUBCUTANEOUS at 06:43

## 2019-03-17 RX ADMIN — Medication 100 MICROGRAM(S): at 06:41

## 2019-03-17 RX ADMIN — ESCITALOPRAM OXALATE 10 MILLIGRAM(S): 10 TABLET, FILM COATED ORAL at 11:31

## 2019-03-17 RX ADMIN — Medication 3 MILLILITER(S): at 00:47

## 2019-03-17 RX ADMIN — Medication 30 MILLIGRAM(S): at 18:25

## 2019-03-17 NOTE — PROGRESS NOTE ADULT - REASON FOR ADMISSION
fever, chills, cough

## 2019-03-17 NOTE — PROGRESS NOTE ADULT - ASSESSMENT
Coag negative staph most likely a contaminant    Right lower lobe pneumonia    Start levofloxocin  I spoke to Dot one of the nursing supervisors at the patients nursing home. She is aware of the patients diagnosis and of the need to give prophylactic anti viral medications to people at the nursing home exposed to the patient
ASSESSMENT/PLAN    1) Influenza  2) Pneumonia  3) Hx dysphagia    Oxygen as needed for a satisfactory SpO2, observe now off  Aspiration precautions, swallow evaluation  Bronchodilators:  Atrovent/ albuterol q 4 – 6 hours as needed  ID/Antibiotics: oseltamivir, levofloxacin  Cardiac/HTN: satisfactory  GI: Rx/ prophylaxis c PPI/H2B  Heme: Rx/VT prophylaxis   Aspiration precautions at all times.  Discussed with managing team
89 yo M with PMHx stroke (cerebellar per family, no residual deficits), ITP, b/l knee replacements who presents from rehab (after recent hospitalization for UTI) with fever and productive cough x 1 day, found to be influenza A positive and RLL pna seen on CXR.
89 yo M with PMHx stroke (cerebellar per family, no residual deficits), ITP, b/l knee replacements who presents from rehab (after recent hospitalization for UTI) with fever and productive cough x 1 day, found to be influenza A positive and RLL pna seen on CXR.    1. Influenza, clinically improving, last dose of tamiflu is tonight, wean off oxygen as tolerated    2. Pneumonia, improving as well. C/w levofloxacin total of 5 days. Appreciate ID and Pulmonary recs. Wean off NC as tolerated. C/w nebs ATC.    3. Stable CKD stage 3- likely CKD.    4. History of ITP, received Nplate (romiplostim on Friday). Hem is on board.    5. Hypothyroidsm, c/w levothyroxine    6. Anxiety, c/w lexapro.    Plan for discharge back to [facility] tomorrow.
89 yo M with PMHx stroke (cerebellar per family, no residual deficits), ITP, b/l knee replacements who presents from rehab (after recent hospitalization for UTI) with fever and productive cough x 1 day, found to be influenza A positive.
89 yo M with PMHx stroke (cerebellar per family, no residual deficits), ITP, b/l knee replacements who presents from rehab (after recent hospitalization for UTI) with fever and productive cough x 1 day, found to be influenza A positive.
89 yo M with PMHx stroke (cerebellar per family, no residual deficits), ITP, b/l knee replacements who presents from rehab (after recent hospitalization for UTI) with fever and productive cough x 1 day, found to be influenza A positive and RLL pna seen on CXR.
89 yo M with PMHx stroke (cerebellar per family, no residual deficits), ITP, b/l knee replacements who presents from rehab (after recent hospitalization for UTI) with fever and productive cough x 1 day, found to be influenza A positive.     Problem Selector:  PROBLEM DIAGNOSES  Problem: Type A influenza  Assessment and Plan: Influenza A + on RVP, likely causing his presenting symptoms of fever and productive cough.   - tamiflu x 5 days (renally dosed)   - supportive treatment, Tylenol PRN for fever/discomfort  - O2 sat on RA: 94-95%  -Gram positive in clusters and pairs growing in 1 aerobic bottle from [facility] yesterday, given low suspicion for bacterial pneumonia will hold off on antibiotics at this time. But low threshold to restart if clinically worsens  -Surveillance culture w/ NGTD    Problem: Pneumonia due to infectious organism  Assessment and Plan: Procalcitonin 0.27 and RLL infiltrate on CXR likely 2/2 CAP  -Levaquin 750mg q48hr for total of 5 day (3/14-3/19) renally dosed due to CrCl     Problem: Chronic kidney disease (CKD)  Assessment and Plan: Creatinine on admission elevated, CKD Stage III vs. JORDANA. Unknown baseline. May be elevated in setting of infection vs. recent antibiotic use vs. baseline CKD.   - trend BUN/Cr  - continue to monitor    Problem: History of ITP  Assessment and Plan: Has a history of ITP for which he has been on steroid courses for in the past, most recently was on short 1 week steroid taper that ended 3/8. Follows with Dr. Madrid in [place]. Gets Nplate (romiplostim) shots occasionally per family, most recently on Friday. Currently low normal platelet count.   - obtain collateral from Dr. Madrid regarding need for further romiplostim infusions or treatment; likely would be as an outpatient  - no additional treatment    Problem: Anxiety  Assessment and Plan: History of depression/anxiety, on escitalopram for many years.   - continue home escitalopram 10 mg    Problem: Hypothyroidism  Assessment and Plan: - continue home levothyroxine 100 mcg daily
ASSESSMENT/PLAN    1) Influenza  2) Pneumonia  3) Hx dysphagia    Oxygen as needed for a satisfactory SpO2, observe now off  Aspiration precautions, swallow evaluation  Bronchodilators:  Atrovent/ albuterol q 4 – 6 hours as needed  ID/Antibiotics: oseltamivir, levofloxacin  Cardiac/HTN: satisfactory  GI: Rx/ prophylaxis c PPI/H2B  Heme: Rx/VT prophylaxis   Aspiration precautions at all times.  Discussed with managing team
ASSESSMENT/PLAN 89y/o pt c likely superinfected Influenza A, evolving RLL pneumonia, possible aspiration, respiratory insufficiency, HTN    1. O2 2LNC humidify  2. Bronchodilators:  Atrovent/ albuterol q 4 – 6 hours as needed  3. Corticosteroids: off   4. ID/Antibiotics: Oseltamivir, recommend continue Ceftriaxone, recommend adding Zithromax discussed c ID , attempt sputum CX, send Legionella urine Ag.  5. Cardiac/HTN: optimize BP  6. GI: Rx/ prophylaxis c PPI/H2B  7. Heme: Rx/VT prophylaxis c SQH/SCD/  8. Aspiration precautions at all times, obtain Speech/swallow evaluation  9. Recommend CT chest non contrast today  Discussed with managing team, ER attending, .
ASSESSMENT/PLAN 89y/o pt c likely superinfected Influenza A, evolving RLL pneumonia, possible aspiration, respiratory insufficiency, HTN    1. O2 2LNC humidify  2. Bronchodilators:  Atrovent/ albuterol q 4 – 6 hours as needed  3. Corticosteroids: off   4. ID/Antibiotics: Oseltamivir, continue Ceftriaxone, recommend adding Zithromax discussed c ID , attempt sputum CX, send Legionella urine Ag.  5. Cardiac/HTN: optimize BP  6. GI: Rx/ prophylaxis c PPI/H2B  7. Heme: Rx/VT prophylaxis c SQH/SCD/  8. Aspiration precautions at all times, obtain Speech/swallow evaluation  Discussed with managing team, ER attending, .
ASSESSMENT/PLAN 89y/o pt c likely superinfected Influenza A, evolving RLL pneumonia, possible aspiration, respiratory insufficiency, HTN    1. O2 observe now off  2. Bronchodilators:  Atrovent/ albuterol q 4 – 6 hours as needed  3. Corticosteroids: off   4. ID/Antibiotics: Oseltamivir, pt now on Levaquin,  discussed c ID , attempt sputum CX, send Legionella urine Ag.  5. Cardiac/HTN: optimize BP  6. GI: Rx/ prophylaxis c PPI/H2B  7. Heme: Rx/VT prophylaxis c SQH/SCD/  8. Aspiration precautions at all times, obtain Speech/swallow evaluation  9. Recommend CT chest non contrast, unless contraindicated  10.PT, mobilize,   11. ATTempt OOB, voiding trial in am if pt stable.  Discussed with managing team, .
continue Tamiflu    Please obtain CAT of chest to clarify if a pneumonia is present    urine legionella antigen

## 2019-03-18 VITALS
RESPIRATION RATE: 18 BRPM | TEMPERATURE: 99 F | HEART RATE: 77 BPM | OXYGEN SATURATION: 96 % | SYSTOLIC BLOOD PRESSURE: 124 MMHG | DIASTOLIC BLOOD PRESSURE: 65 MMHG

## 2019-03-18 LAB
ANION GAP SERPL CALC-SCNC: 9 MMOL/L — SIGNIFICANT CHANGE UP (ref 5–17)
BUN SERPL-MCNC: 13 MG/DL — SIGNIFICANT CHANGE UP (ref 7–23)
CALCIUM SERPL-MCNC: 8.3 MG/DL — LOW (ref 8.4–10.5)
CHLORIDE SERPL-SCNC: 103 MMOL/L — SIGNIFICANT CHANGE UP (ref 96–108)
CO2 SERPL-SCNC: 27 MMOL/L — SIGNIFICANT CHANGE UP (ref 22–31)
CREAT SERPL-MCNC: 1.22 MG/DL — SIGNIFICANT CHANGE UP (ref 0.5–1.3)
CULTURE RESULTS: SIGNIFICANT CHANGE UP
GLUCOSE SERPL-MCNC: 88 MG/DL — SIGNIFICANT CHANGE UP (ref 70–99)
HCT VFR BLD CALC: 33.8 % — LOW (ref 39–50)
HGB BLD-MCNC: 11.3 G/DL — LOW (ref 13–17)
MAGNESIUM SERPL-MCNC: 2 MG/DL — SIGNIFICANT CHANGE UP (ref 1.6–2.6)
MCHC RBC-ENTMCNC: 30.8 PG — SIGNIFICANT CHANGE UP (ref 27–34)
MCHC RBC-ENTMCNC: 33.4 GM/DL — SIGNIFICANT CHANGE UP (ref 32–36)
MCV RBC AUTO: 92.1 FL — SIGNIFICANT CHANGE UP (ref 80–100)
NRBC # BLD: 0 /100 WBCS — SIGNIFICANT CHANGE UP (ref 0–0)
PLATELET # BLD AUTO: 472 K/UL — HIGH (ref 150–400)
POTASSIUM SERPL-MCNC: 4.1 MMOL/L — SIGNIFICANT CHANGE UP (ref 3.5–5.3)
POTASSIUM SERPL-SCNC: 4.1 MMOL/L — SIGNIFICANT CHANGE UP (ref 3.5–5.3)
RBC # BLD: 3.67 M/UL — LOW (ref 4.2–5.8)
RBC # FLD: 12.8 % — SIGNIFICANT CHANGE UP (ref 10.3–14.5)
SODIUM SERPL-SCNC: 139 MMOL/L — SIGNIFICANT CHANGE UP (ref 135–145)
SPECIMEN SOURCE: SIGNIFICANT CHANGE UP
WBC # BLD: 5.09 K/UL — SIGNIFICANT CHANGE UP (ref 3.8–10.5)
WBC # FLD AUTO: 5.09 K/UL — SIGNIFICANT CHANGE UP (ref 3.8–10.5)

## 2019-03-18 PROCEDURE — 97161 PT EVAL LOW COMPLEX 20 MIN: CPT

## 2019-03-18 PROCEDURE — 83605 ASSAY OF LACTIC ACID: CPT

## 2019-03-18 PROCEDURE — 36415 COLL VENOUS BLD VENIPUNCTURE: CPT

## 2019-03-18 PROCEDURE — 85610 PROTHROMBIN TIME: CPT

## 2019-03-18 PROCEDURE — 99239 HOSP IP/OBS DSCHRG MGMT >30: CPT

## 2019-03-18 PROCEDURE — 71045 X-RAY EXAM CHEST 1 VIEW: CPT

## 2019-03-18 PROCEDURE — 93005 ELECTROCARDIOGRAM TRACING: CPT

## 2019-03-18 PROCEDURE — 97530 THERAPEUTIC ACTIVITIES: CPT

## 2019-03-18 PROCEDURE — 87186 SC STD MICRODIL/AGAR DIL: CPT

## 2019-03-18 PROCEDURE — 84145 PROCALCITONIN (PCT): CPT

## 2019-03-18 PROCEDURE — 94640 AIRWAY INHALATION TREATMENT: CPT

## 2019-03-18 PROCEDURE — 81001 URINALYSIS AUTO W/SCOPE: CPT

## 2019-03-18 PROCEDURE — 87150 DNA/RNA AMPLIFIED PROBE: CPT

## 2019-03-18 PROCEDURE — 87086 URINE CULTURE/COLONY COUNT: CPT

## 2019-03-18 PROCEDURE — 99285 EMERGENCY DEPT VISIT HI MDM: CPT | Mod: 25

## 2019-03-18 PROCEDURE — 80048 BASIC METABOLIC PNL TOTAL CA: CPT

## 2019-03-18 PROCEDURE — 85027 COMPLETE CBC AUTOMATED: CPT

## 2019-03-18 PROCEDURE — 84100 ASSAY OF PHOSPHORUS: CPT

## 2019-03-18 PROCEDURE — 82330 ASSAY OF CALCIUM: CPT

## 2019-03-18 PROCEDURE — 85730 THROMBOPLASTIN TIME PARTIAL: CPT

## 2019-03-18 PROCEDURE — 97116 GAIT TRAINING THERAPY: CPT

## 2019-03-18 PROCEDURE — 85025 COMPLETE CBC W/AUTO DIFF WBC: CPT

## 2019-03-18 PROCEDURE — 83735 ASSAY OF MAGNESIUM: CPT

## 2019-03-18 PROCEDURE — 96365 THER/PROPH/DIAG IV INF INIT: CPT

## 2019-03-18 PROCEDURE — 87631 RESP VIRUS 3-5 TARGETS: CPT

## 2019-03-18 PROCEDURE — 82803 BLOOD GASES ANY COMBINATION: CPT

## 2019-03-18 PROCEDURE — 84132 ASSAY OF SERUM POTASSIUM: CPT

## 2019-03-18 PROCEDURE — 96375 TX/PRO/DX INJ NEW DRUG ADDON: CPT

## 2019-03-18 PROCEDURE — 87040 BLOOD CULTURE FOR BACTERIA: CPT

## 2019-03-18 PROCEDURE — 96361 HYDRATE IV INFUSION ADD-ON: CPT

## 2019-03-18 PROCEDURE — 84295 ASSAY OF SERUM SODIUM: CPT

## 2019-03-18 PROCEDURE — 80053 COMPREHEN METABOLIC PANEL: CPT

## 2019-03-18 RX ORDER — ROMIPLOSTIM 250 UG/.5ML
320 INJECTION, POWDER, LYOPHILIZED, FOR SOLUTION SUBCUTANEOUS
Qty: 0 | Refills: 0 | COMMUNITY

## 2019-03-18 RX ORDER — ACETAMINOPHEN 500 MG
2 TABLET ORAL
Qty: 0 | Refills: 0 | COMMUNITY
Start: 2019-03-18

## 2019-03-18 RX ADMIN — Medication 3 MILLILITER(S): at 06:32

## 2019-03-18 RX ADMIN — HEPARIN SODIUM 5000 UNIT(S): 5000 INJECTION INTRAVENOUS; SUBCUTANEOUS at 06:32

## 2019-03-18 RX ADMIN — Medication 3 MILLILITER(S): at 00:44

## 2019-03-18 RX ADMIN — Medication 100 MICROGRAM(S): at 06:32

## 2019-03-18 NOTE — PROGRESS NOTE ADULT - PROVIDER SPECIALTY LIST ADULT
Hospitalist
Infectious Disease
Internal Medicine
Pulmonology
Rehab Medicine
Pulmonology
Infectious Disease

## 2019-03-18 NOTE — DISCHARGE NOTE NURSING/CASE MANAGEMENT/SOCIAL WORK - NSDCDPATPORTLINK_GEN_ALL_CORE
You can access the [facility] Portal, offered by [facility], by registering with the following website: [email or web address]

## 2019-03-22 DIAGNOSIS — J18.1 LOBAR PNEUMONIA, UNSPECIFIED ORGANISM: ICD-10-CM

## 2019-03-22 DIAGNOSIS — F32.9 MAJOR DEPRESSIVE DISORDER, SINGLE EPISODE, UNSPECIFIED: ICD-10-CM

## 2019-03-22 DIAGNOSIS — Z86.73 PERSONAL HISTORY OF TRANSIENT ISCHEMIC ATTACK (TIA), AND CEREBRAL INFARCTION WITHOUT RESIDUAL DEFICITS: ICD-10-CM

## 2019-03-22 DIAGNOSIS — Z79.82 LONG TERM (CURRENT) USE OF ASPIRIN: ICD-10-CM

## 2019-03-22 DIAGNOSIS — D69.0 ALLERGIC PURPURA: ICD-10-CM

## 2019-03-22 DIAGNOSIS — F41.9 ANXIETY DISORDER, UNSPECIFIED: ICD-10-CM

## 2019-03-22 DIAGNOSIS — E87.3 ALKALOSIS: ICD-10-CM

## 2019-03-22 DIAGNOSIS — J10.00 INFLUENZA DUE TO OTHER IDENTIFIED INFLUENZA VIRUS WITH UNSPECIFIED TYPE OF PNEUMONIA: ICD-10-CM

## 2019-03-22 DIAGNOSIS — E03.9 HYPOTHYROIDISM, UNSPECIFIED: ICD-10-CM

## 2019-03-22 DIAGNOSIS — Z96.653 PRESENCE OF ARTIFICIAL KNEE JOINT, BILATERAL: ICD-10-CM

## 2019-03-22 DIAGNOSIS — N18.3 CHRONIC KIDNEY DISEASE, STAGE 3 (MODERATE): ICD-10-CM

## 2019-03-22 DIAGNOSIS — Z88.0 ALLERGY STATUS TO PENICILLIN: ICD-10-CM

## 2019-03-22 DIAGNOSIS — Z66 DO NOT RESUSCITATE: ICD-10-CM

## 2019-03-22 DIAGNOSIS — R63.8 OTHER SYMPTOMS AND SIGNS CONCERNING FOOD AND FLUID INTAKE: ICD-10-CM

## 2019-03-22 DIAGNOSIS — Z87.440 PERSONAL HISTORY OF URINARY (TRACT) INFECTIONS: ICD-10-CM

## 2019-03-22 DIAGNOSIS — I12.9 HYPERTENSIVE CHRONIC KIDNEY DISEASE WITH STAGE 1 THROUGH STAGE 4 CHRONIC KIDNEY DISEASE, OR UNSPECIFIED CHRONIC KIDNEY DISEASE: ICD-10-CM

## 2024-04-09 NOTE — PROGRESS NOTE ADULT - ATTENDING COMMENTS
C/w tamiflu and levofloxacin for treatment of Influenza and CAP respectively  Apprec Hem recs  Rest as above  Plan for dc to [facility] on Monday 09-Apr-2024 01:33